# Patient Record
Sex: FEMALE | Race: WHITE | Employment: OTHER | URBAN - METROPOLITAN AREA
[De-identification: names, ages, dates, MRNs, and addresses within clinical notes are randomized per-mention and may not be internally consistent; named-entity substitution may affect disease eponyms.]

---

## 2017-01-12 ENCOUNTER — HOSPITAL ENCOUNTER (OUTPATIENT)
Dept: MAMMOGRAPHY | Age: 57
Discharge: HOME | End: 2017-01-12
Payer: COMMERCIAL

## 2017-01-12 DIAGNOSIS — Z12.31 VISIT FOR SCREENING MAMMOGRAM: ICD-10-CM

## 2017-01-12 PROCEDURE — 77067 SCR MAMMO BI INCL CAD: CPT

## 2018-02-13 ENCOUNTER — HOSPITAL ENCOUNTER (OUTPATIENT)
Dept: MAMMOGRAPHY | Age: 58
Discharge: HOME | End: 2018-02-13
Attending: OBSTETRICS & GYNECOLOGY
Payer: COMMERCIAL

## 2018-02-13 DIAGNOSIS — Z12.39 BREAST CANCER SCREENING: ICD-10-CM

## 2018-02-13 PROCEDURE — 77063 BREAST TOMOSYNTHESIS BI: CPT

## 2019-02-14 ENCOUNTER — HOSPITAL ENCOUNTER (OUTPATIENT)
Dept: MAMMOGRAPHY | Age: 59
Discharge: HOME | End: 2019-02-14
Attending: OBSTETRICS & GYNECOLOGY
Payer: COMMERCIAL

## 2019-02-14 DIAGNOSIS — Z12.31 VISIT FOR SCREENING MAMMOGRAM: ICD-10-CM

## 2019-02-14 PROCEDURE — 77063 BREAST TOMOSYNTHESIS BI: CPT

## 2020-02-18 ENCOUNTER — HOSPITAL ENCOUNTER (OUTPATIENT)
Dept: MAMMOGRAPHY | Age: 60
Discharge: HOME | End: 2020-02-18
Attending: INTERNAL MEDICINE
Payer: COMMERCIAL

## 2020-02-18 DIAGNOSIS — Z12.31 ENCOUNTER FOR SCREENING MAMMOGRAM FOR BREAST CANCER: ICD-10-CM

## 2020-02-18 PROCEDURE — 77063 BREAST TOMOSYNTHESIS BI: CPT

## 2021-01-14 ENCOUNTER — TRANSCRIBE ORDER (OUTPATIENT)
Dept: SCHEDULING | Age: 61
End: 2021-01-14

## 2021-01-14 DIAGNOSIS — Z12.31 VISIT FOR SCREENING MAMMOGRAM: Primary | ICD-10-CM

## 2021-03-19 ENCOUNTER — HOSPITAL ENCOUNTER (OUTPATIENT)
Dept: MAMMOGRAPHY | Age: 61
Discharge: HOME | End: 2021-03-19
Attending: INTERNAL MEDICINE
Payer: COMMERCIAL

## 2021-03-19 DIAGNOSIS — Z12.31 VISIT FOR SCREENING MAMMOGRAM: ICD-10-CM

## 2021-03-19 PROCEDURE — 77063 BREAST TOMOSYNTHESIS BI: CPT

## 2022-02-18 ENCOUNTER — TRANSCRIBE ORDER (OUTPATIENT)
Dept: SCHEDULING | Age: 62
End: 2022-02-18

## 2022-02-18 DIAGNOSIS — Z12.31 ENCOUNTER FOR SCREENING MAMMOGRAM FOR BREAST CANCER: Primary | ICD-10-CM

## 2022-03-03 ENCOUNTER — TELEPHONE (OUTPATIENT)
Dept: INTERNAL MEDICINE CLINIC | Age: 62
End: 2022-03-03

## 2022-03-03 NOTE — TELEPHONE ENCOUNTER
----- Message from Job Nunez sent at 3/3/2022  3:30 PM EST -----  Regarding: New Patient  Patient has not been seen since 2016 so new patient. I do not see where Dr. Valeda Runner said he would take her as a new patient.   Please call and reschedule with another provider

## 2022-03-04 ENCOUNTER — PATIENT MESSAGE (OUTPATIENT)
Dept: NEUROLOGY | Age: 62
End: 2022-03-04

## 2022-03-04 ENCOUNTER — OFFICE VISIT (OUTPATIENT)
Dept: INTERNAL MEDICINE CLINIC | Age: 62
End: 2022-03-04
Payer: COMMERCIAL

## 2022-03-04 VITALS
WEIGHT: 137 LBS | BODY MASS INDEX: 25.21 KG/M2 | DIASTOLIC BLOOD PRESSURE: 82 MMHG | HEIGHT: 62 IN | HEART RATE: 77 BPM | RESPIRATION RATE: 16 BRPM | TEMPERATURE: 97.3 F | OXYGEN SATURATION: 99 % | SYSTOLIC BLOOD PRESSURE: 134 MMHG

## 2022-03-04 DIAGNOSIS — R73.01 IFG (IMPAIRED FASTING GLUCOSE): ICD-10-CM

## 2022-03-04 DIAGNOSIS — R41.3 MEMORY PROBLEM: ICD-10-CM

## 2022-03-04 DIAGNOSIS — E55.9 HYPOVITAMINOSIS D: ICD-10-CM

## 2022-03-04 DIAGNOSIS — E78.5 DYSLIPIDEMIA: ICD-10-CM

## 2022-03-04 DIAGNOSIS — Z00.00 PHYSICAL EXAM: Primary | ICD-10-CM

## 2022-03-04 DIAGNOSIS — D12.6 COLON ADENOMA: ICD-10-CM

## 2022-03-04 DIAGNOSIS — Z00.00 PHYSICAL EXAM: ICD-10-CM

## 2022-03-04 PROCEDURE — 99396 PREV VISIT EST AGE 40-64: CPT | Performed by: INTERNAL MEDICINE

## 2022-03-04 NOTE — PROGRESS NOTES
57 y.o. WHITE/NON- female who presents for NPE. She has been lost to follow up since 2016    They sold their business and she has retired! No cardiovascular complaints. Exercising by walking 4x/week about 7 miles. Denies polyuria, polydipsia, nocturia, vision change. Not checking sugars at this time. She's been able to get her weight down some    Vitals 3/4/2022 4/14/2016 4/8/2015   Weight 137 lb 144 lb 145 lb     No gi or gu complaints. She saw someone after Dr Nai Gaspar passed away in 2019? She is overdue for colo by several years  Denies any sx referable to the thyroid     Only concern is memory problems. She forgets mostly inconsequential things but it's been bothering her a lot. Interested in pursuing workup as she's noted some progression over the last few years.   No problems with paying bills, driving, she forgets why she walks into a room, trivial conversations, etc    Past Medical History:   Diagnosis Date    Colon adenoma 4/13    Dr. Manjula Baptiste Dyslipidemia     calculated risk score 1.6% (12/13); 1.4% (4/15);    FHx: heart disease     GERD (gastroesophageal reflux disease)     HSV infection     Hypovitaminosis D     Dr. Rachel Perez IFG (impaired fasting glucose) 03/2015    Zoster 4/15    T11 left side     Past Surgical History:   Procedure Laterality Date   Kamini Border      Dr. Mike Fernandez 4/13 adenoma    HX HEMORRHOIDECTOMY       Social History     Socioeconomic History    Marital status:      Spouse name: Not on file    Number of children: 2    Years of education: Not on file    Highest education level: Not on file   Occupational History    Occupation: retired franchise owner   Tobacco Use    Smoking status: Never Smoker    Smokeless tobacco: Not on file   Substance and Sexual Activity    Alcohol use: Yes     Comment: ocassionally    Drug use: No    Sexual activity: Not on file   Other Topics Concern    Not on file   Social History Narrative    Not on file     Social Determinants of Health     Financial Resource Strain:     Difficulty of Paying Living Expenses: Not on file   Food Insecurity:     Worried About Running Out of Food in the Last Year: Not on file    Stew of Food in the Last Year: Not on file   Transportation Needs:     Lack of Transportation (Medical): Not on file    Lack of Transportation (Non-Medical):  Not on file   Physical Activity:     Days of Exercise per Week: Not on file    Minutes of Exercise per Session: Not on file   Stress:     Feeling of Stress : Not on file   Social Connections:     Frequency of Communication with Friends and Family: Not on file    Frequency of Social Gatherings with Friends and Family: Not on file    Attends [employer] Services: Not on file    Active Member of [address] [place] [address] [employer] or Organizations: Not on file    Attends Club or Organization Meetings: Not on file    Marital Status: Not on file   Intimate Partner Violence:     Fear of Current or Ex-Partner: Not on file    Emotionally Abused: Not on file    Physically Abused: Not on file    Sexually Abused: Not on file   Housing Stability:     Unable to Pay for Housing in the Last Year: Not on file    Number of [place] in the Last Year: Not on file    Unstable Housing in the Last Year: Not on file     Family History   Problem Relation Age of Onset    Elevated Lipids Mother     Hypertension Mother     Breast Cancer Mother     Elevated Lipids Father     Hypertension Father     Heart Disease Brother     Heart Disease Maternal Grandfather     Ovarian Cancer Maternal Grandmother      Immunization History   Administered Date(s) Administered    COVID-19, Pfizer Purple top, DILUTE for use, 12+ yrs, 30mcg/0.3mL dose 03/20/2021, 04/10/2021, 10/29/2021    Influenza Vaccine 10/25/2013, 09/17/2014, 10/23/2018, 10/29/2020    Influenza Vaccine (Quad) Mdck Pf (>2 Yrs Flucelvax QUAD 23226) 10/15/2019    Influenza Vaccine (Quad) PF (>6 Mo Flulaval, Fluarix, and >3 Yrs Afluria, Fluzone [id number]) 10/13/2016    Tdap 08/24/2016    Zoster Vaccine, Live 12/17/2013     Current Outpatient Medications   Medication Sig    valACYclovir (VALTREX) 1 gram tablet Take 1 Tab by mouth three (3) times daily. No current facility-administered medications for this visit. Not on File    REVIEW OF SYSTEMS: gyn 2019, mammo 3/21, colo 4/13  Ophtho - no vision change or eye pain  Oral - no mouth pain, tongue or tooth problems  Ears - no hearing loss, ear pain, fullness, no swallowing problems  Cardiac - no CP, PND, orthopnea, edema, palpitations or syncope  Chest - no breast masses  Resp - no wheezing, chronic coughing, dyspnea  GI - no heartburn, nausea, vomiting, change in bowel habits, bleeding, hemorrhoids  Urinary - no dysuria, hematuria, flank pain, urgency, frequency    Visit Vitals  /82   Pulse 77   Temp 97.3 °F (36.3 °C)   Resp 16   Ht 5' 2\" (1.575 m)   Wt 137 lb (62.1 kg)   SpO2 99%   BMI 25.06 kg/m²   A&O x3  Affect is appropriate. Mood stable  No apparent distress  Anicteric, no JVD, adenopathy or thyromegaly. No carotid bruits or radiated murmur  Lungs clear to auscultation, no wheezes or rales  Heart showed regular rate and rhythm. No murmur, rubs, gallops  Abdomen soft nontender, no hepatosplenomegaly or masses. Extremities without edema.   Pulses 1-2+ symmetrically    LABS  From 12/13 showed gluc 99,   cr 0.70, gfr>60, alt 10,          chol 180, tg 89,   hdl 44, ldl-c 119, wbc 5.4, hb 13.2, plt 202,        ua neg  From 3/15 showed   gluc 102, cr 0.77, gfr>60, alt<5,           chol 194, tg 84,   hdl 49, ldl-c 128, wbc 4.4, hb 13.6, plt 192, vit d 28.2, ua neg  From 4/16 showed   gluc 111, cr 0.81, gfr>60, alt 5,   hba1c 5.8, chol 190, tg 167, hdl 42, ldl-c 115, wbc 4.0, hb 12.8, plt 193, tsh 6.44,   hep c-  From 4/16 showed                       tsh 4.27,   ft4 1.28, tpo ab neg    We reviewed the patient's labs from the last several visits to point out trends in the numbers        Patient Active Problem List   Diagnosis Code    Dyslipidemia E78.5    Hypovitaminosis D E55.9    Colon adenoma 4/13 Dr Ketty Tolbert D12.6    IFG (impaired fasting glucose) R73.01     Assessment and plan:  1. Dyslipidemia. Dietary and lifestyle measures, labs drawn today  2. Vit d def. Supplementation  3. Colon adenoma. Fiber, colo to be scheduled GLST  4. IFG. Check labs, dietary and lifestyle measures and continue trying to lose weight  5. Memory issues. Long discussion. Check labs, will send to neuro and neuropsych, defer imaging to them        RTC 3/23    Above conditions discussed at length and patient vocalized understanding. All questions answered to patient satifaction        ICD-10-CM ICD-9-CM    1. Physical exam  Z00.00 V70.9 CBC W/O DIFF      METABOLIC PANEL, COMPREHENSIVE      LIPID PANEL      HEMOGLOBIN A1C W/O EAG      TSH AND FREE T4   2. Colon adenoma  D12.6 211.3 REFERRAL TO GASTROENTEROLOGY   3. Memory problem  R41.3 780.93 REFERRAL TO NEUROLOGY      REFERRAL TO NEUROPSYCHOLOGY   4. Dyslipidemia  E78.5 272.4    5. IFG (impaired fasting glucose)  R73.01 790.21    6.  Hypovitaminosis D  E55.9 268.9

## 2022-03-04 NOTE — PROGRESS NOTES
Harjinder Moscoso presents today for   Chief Complaint   Patient presents with    Physical    Diabetes    Cholesterol Problem       1. \"Have you been to the ER, urgent care clinic since your last visit? Hospitalized since your last visit? \" no    2. \"Have you seen or consulted any other health care providers outside of the [address] [place] [address] since your last visit? \" no     3. For patients aged 39-70: Has the patient had a colonoscopy / FIT/ Cologuard? Yes - no Care Gap present      If the patient is female:    4. For patients aged 41-77: Has the patient had a mammogram within the past 2 years? yes  See top three    5. For patients aged 21-65: Has the patient had a pap smear?

## 2022-03-04 NOTE — LETTER
3/7/2022 8:06 AM        [id number]      Dear Ms. Percy James:    Please call our office at [phone] and schedule an annual physical with labs in March of 2023 for your continued care.         Sincerely,      Charlotte Kilpatrick MD

## 2022-03-06 ENCOUNTER — TELEPHONE (OUTPATIENT)
Dept: INTERNAL MEDICINE CLINIC | Age: 62
End: 2022-03-06

## 2022-03-07 ENCOUNTER — OFFICE VISIT (OUTPATIENT)
Dept: NEUROLOGY | Age: 62
End: 2022-03-07
Payer: COMMERCIAL

## 2022-03-07 ENCOUNTER — HOSPITAL ENCOUNTER (OUTPATIENT)
Dept: LAB | Age: 62
Discharge: HOME | End: 2022-03-07

## 2022-03-07 VITALS
DIASTOLIC BLOOD PRESSURE: 80 MMHG | HEART RATE: 63 BPM | RESPIRATION RATE: 16 BRPM | OXYGEN SATURATION: 97 % | SYSTOLIC BLOOD PRESSURE: 102 MMHG | BODY MASS INDEX: 25.28 KG/M2 | WEIGHT: 138.2 LBS

## 2022-03-07 DIAGNOSIS — R41.89 COGNITIVE CHANGE: ICD-10-CM

## 2022-03-07 DIAGNOSIS — R41.3 MEMORY LOSS: ICD-10-CM

## 2022-03-07 DIAGNOSIS — R41.3 MEMORY LOSS: Primary | ICD-10-CM

## 2022-03-07 LAB
ALBUMIN SERPL-MCNC: 4.6 G/DL (ref 3.8–4.8)
ALBUMIN/GLOB SERPL: 2.9 {RATIO} (ref 1.2–2.2)
ALP SERPL-CCNC: 55 IU/L (ref 44–121)
ALT SERPL-CCNC: 12 IU/L (ref 0–32)
AST SERPL-CCNC: 18 IU/L (ref 0–40)
BILIRUB SERPL-MCNC: 0.7 MG/DL (ref 0–1.2)
BUN SERPL-MCNC: 14 MG/DL (ref 8–27)
BUN/CREAT SERPL: 19 (ref 12–28)
CALCIUM SERPL-MCNC: 9.6 MG/DL (ref 8.7–10.3)
CHLORIDE SERPL-SCNC: 106 MMOL/L (ref 96–106)
CHOLEST SERPL-MCNC: 196 MG/DL (ref 100–199)
CO2 SERPL-SCNC: 22 MMOL/L (ref 20–29)
CREAT SERPL-MCNC: 0.73 MG/DL (ref 0.57–1)
EGFR: 93 ML/MIN/1.73
ERYTHROCYTE [DISTWIDTH] IN BLOOD BY AUTOMATED COUNT: 13 % (ref 11.7–15.4)
GLOBULIN SER CALC-MCNC: 1.6 G/DL (ref 1.5–4.5)
GLUCOSE SERPL-MCNC: 94 MG/DL (ref 65–99)
HBA1C MFR BLD: 5.3 % (ref 4.8–5.6)
HCT VFR BLD AUTO: 40.9 % (ref 34–46.6)
HDLC SERPL-MCNC: 53 MG/DL
HGB BLD-MCNC: 13.6 G/DL (ref 11.1–15.9)
IMP & REVIEW OF LAB RESULTS: NORMAL
INTERPRETATION: NORMAL
LDLC SERPL CALC-MCNC: 128 MG/DL (ref 0–99)
MCH RBC QN AUTO: 29.6 PG (ref 26.6–33)
MCHC RBC AUTO-ENTMCNC: 33.3 G/DL (ref 31.5–35.7)
MCV RBC AUTO: 89 FL (ref 79–97)
PLATELET # BLD AUTO: 200 X10E3/UL (ref 150–450)
POTASSIUM SERPL-SCNC: 5.2 MMOL/L (ref 3.5–5.2)
PROT SERPL-MCNC: 6.2 G/DL (ref 6–8.5)
RBC # BLD AUTO: 4.6 X10E6/UL (ref 3.77–5.28)
SODIUM SERPL-SCNC: 143 MMOL/L (ref 134–144)
T4 FREE SERPL-MCNC: 1.19 NG/DL (ref 0.82–1.77)
TRIGL SERPL-MCNC: 82 MG/DL (ref 0–149)
TSH SERPL DL<=0.005 MIU/L-ACNC: 3.68 UIU/ML (ref 0.45–4.5)
VLDLC SERPL CALC-MCNC: 15 MG/DL (ref 5–40)
WBC # BLD AUTO: 3.2 X10E3/UL (ref 3.4–10.8)
XX-LABCORP SPECIMEN COL,LCBCF: NORMAL

## 2022-03-07 PROCEDURE — 99001 SPECIMEN HANDLING PT-LAB: CPT

## 2022-03-07 PROCEDURE — 99204 OFFICE O/P NEW MOD 45 MIN: CPT | Performed by: NURSE PRACTITIONER

## 2022-03-07 NOTE — PROGRESS NOTES
Gracie Govea is a 57 y.o. female who is in the office as a New Patient. 1. Have you been to the ER, urgent care clinic since your last visit? Hospitalized since your last visit? No    2. Have you seen or consulted any other health care providers outside of the [address] [place] [address] since your last visit? Include any pap smears or colon screening.  No

## 2022-03-07 NOTE — PROGRESS NOTES
[facility]  [address], [address], [place], [address]  [address] [place], [address]  Office:  [phone]  Fax: [phone]    Referring: Juancarlos Sutton MD      Chief Complaint   Patient presents with    New Patient    Memory Loss       HPI:  Sherrill Tony presents in new patient evaluation for memory problems. She saw her PCP on 3/4/2022 for new patient evaluation. Her only concern was memory problems. It was noted that she forgets mostly inconsequential things but has been bothering her a lot. She was interested in pursuing work-up as she has noted some progression over the last few years. No problems with paying bills or driving. She forgets why she walks into a room, trivial conversations, etc. 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 She exercises by walking four times a week. Past medical history includes: Adenoma, impaired fasting glucose, zoster at T11 on the left side, dyslipidemia, GERD, HSV infection, and low vitamin D. Family history positive for heart disease. She had labs on 3/4/2022 which showed CBC normal except WBC mildly low, 3.2, CMP showed A/G ratio elevated, 2.9, but otherwise normal. Lipid panel showed elevated LDL-C of 128. Hemoglobin A1c normal, 5.3. TSH normal.    She presents today in evaluation. Reports she has had some minor memory concerns when seen her PCP back in 2016 and reestablished care with him recently. She has a hard time with her memory. It was believed that this may be normal aging at first, but this still continues, is more noticeable, and she does not feel like she is remembering nearly enough. No problems with ADLs. She drives. 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 Often can't remember movies, etc, can't remember actresses for example. She forgets what she went into a room for. Forgets some stuff from childhood.   Her mother is 16years older than her, her memory is great.  When she speaks to family members it may trigger her memory, but did not initially remember some things. She has a hard time recalling little things. She could not remember the sequence of exercises at a fitness class with her daughter. It upset her, made her feel like she is not in control of her mind. Before retiring, she may forget where she saved a file, but felt like that may be just getting older. She likes to always keep to a routine. It is hard working the tv with multiple remotes in multiple different settings. She cooks. No safety concerns. No driving concerns. No getting lost.  Denies pain. She is going to have a colonoscopy, gets some side pain intermittently. When she wakes in the AM, sometimes has a pain in the chest, better when sleeps on back, but some tightness when sleeps on her side. Not reproducible. Right in the center. She sleeps through the night, may get up 1-2 times to void, but gets back to sleep. She snores, but not particularly loud and denies other concerns for sleep apnea. She sleeps lightly. Denies daytime fatigue. She goes to bed early. Denies history of mood disorder. Denies stroke, seizures, head injury, or LOC spell. Denies tremor. Denies mobility problems. She walks a lot for exercise, 5-6 miles a few times a week. Her only surgery was hemorrhoid surgery. Denies headaches. She has a hard time focusing if the radio is on or other people are talking like her kids or others nearby at [facility]. Multitasking is difficult. Family history: Positive for heart disease. Her grandfather  of an MI in his 45s and her brother did as well. Her mother is well, with hyperlipidemia. Her brother was born with a frontal lobe brain disorder, has a variety of health issues, is disabled, with hypertension and hyperlipidemia. Denies known family history of dementia. Social history: She lives with her . She has 4 adult children.   She is retired, just retired in December from [employer]. Denies smoking or illicit drug use. She has never been a smoker. Occasional alcohol use, about 3 days/week, the most in one day would be 3 drinks. Social History     Socioeconomic History    Marital status:      Spouse name: Not on file    Number of children: 2    Years of education: Not on file    Highest education level: Not on file   Occupational History    Occupation: retired franchise owner   Tobacco Use    Smoking status: Never Smoker    Smokeless tobacco: Not on file   Substance and Sexual Activity    Alcohol use: Yes     Comment: ocassionally    Drug use: No    Sexual activity: Not on file   Other Topics Concern    Not on file   Social History Narrative    Not on file     Social Determinants of Health     Financial Resource Strain:     Difficulty of Paying Living Expenses: Not on file   Food Insecurity:     Worried About [address] in the Last Year: Not on file    [address] in the Last Year: Not on file   Transportation Needs:     Lack of Transportation (Medical): Not on file    Lack of Transportation (Non-Medical):  Not on file   Physical Activity:     Days of Exercise per Week: Not on file    Minutes of Exercise per Session: Not on file   Stress:     Feeling of Stress : Not on file   Social Connections:     Frequency of Communication with Friends and Family: Not on file    Frequency of Social Gatherings with Friends and Family: Not on file    Attends Buddhist Services: Not on file    Active Member of Clubs or Organizations: Not on file    Attends Club or Organization Meetings: Not on file    Marital Status: Not on file   Intimate Partner Violence:     Fear of Current or Ex-Partner: Not on file    Emotionally Abused: Not on file    Physically Abused: Not on file    Sexually Abused: Not on file   Housing Stability:     Unable to Pay for Housing in the Last Year: Not on file    Number of Places Lived in the Last Year: Not on file    Unstable Housing in the Last Year: Not on file       Family History   Problem Relation Age of Onset    Elevated Lipids Mother     Hypertension Mother     Breast Cancer Mother     Elevated Lipids Father     Hypertension Father     Heart Disease Brother     Heart Disease Maternal Grandfather     Ovarian Cancer Maternal Grandmother        Current Outpatient Medications   Medication Sig Dispense Refill    valACYclovir (VALTREX) 1 gram tablet Take 1 Tab by mouth three (3) times daily. 27 Tab 0       Past Medical History:   Diagnosis Date    Colon adenoma 4/13    Dr. Roxane Randall    Dyslipidemia     calculated risk score 1.6% (12/13); 1.4% (4/15);    FHx: heart disease     GERD (gastroesophageal reflux disease)     HSV infection     Hypovitaminosis D     Dr. Jonny Spencer IFG (impaired fasting glucose) 03/2015    Zoster 4/15    T11 left side       Past Surgical History:   Procedure Laterality Date   Ivan Randall 4/13 adenoma    HX HEMORRHOIDECTOMY         No Known Allergies    Patient Active Problem List   Diagnosis Code    Dyslipidemia E78.5    Hypovitaminosis D E55.9    Colon adenoma 4/13 Dr Roxane Randall D12.6    IFG (impaired fasting glucose) R73.01         Review of Systems:   Constitutional: no fever or chills  Skin denies rash or itching  HEENT:  Denies tinnitus, hearing loss, or visual changes  Respiratory: denies shortness of breath  Cardiovascular: denies chest pain, dyspnea on exertion. +chest discomfort when she wakes up- pressure like someone standing on center of chest, right in center.    Gastrointestinal: does not report nausea or vomiting. +bowel problems in spells, going to see a gastroenterologist.   Genitourinary: does not report dysuria or incontinence  Musculoskeletal: does not report joint pain or swelling  Endocrine: denies weight change  Hematology: denies easy bruising or bleeding   Neurological: as above in HPI      PHYSICAL EXAMINATION:      VITAL SIGNS:    Visit Vitals  /80   Pulse 63   Resp 16   Wt 62.7 kg (138 lb 3.2 oz)   LMP  (LMP Unknown)   SpO2 97%   BMI 25.28 kg/m²       GENERAL: Well developed, well nourished, in no apparent distress. HEART: RR, no murmurs heard, no carotid bruits. LUNGS:                      Respirations regular and unlabored. EXTREMITIES: No clubbing, cyanosis, or edema is identified. Pulses 2+    and symmetrical.  HEAD:   Normocephalic, atraumatic. NEUROLOGIC EXAMINATION    MENTAL STATUS: Awake, alert, and oriented x 4. Attention and STM are grossly normal. There is no aphasia. Fund of knowledge is adequate. Mood and affect are appropriate. She scored 30 out of 30 on the MMSE. Registration: 3 out of 3 objects. Recall at 1 minute: 3 objects, initially recalled 2 but was able to say the third and appeared upset that she thought she could not remember it. No problems on clock drawing. CRANIAL NERVES: Visual fields are full to confrontation. Pupils are reactive to light and accommodation. Extraocular movements are intact and there is no nystagmus. Facial sensation is normal.  Face is symmetrical.   Hearing is grossly intact. SCM/TPZ 5/5. Palate rises symmetrically. Tongue is in the midline. MOTOR:  Normal tone, bulk, and strength, 5/5 muscle strength throughout. No drift of the bilateral upper extremities. Fine finger movements symmetrical.  No cogwheel rigidity present. CEREBELLAR: Finger to nose was normal.   No tremors or dysmetria. SENSORY: Normal PP, LT, andvibration. DTRs:  +2 throughout. GAIT:   Normal gait. She can tandem walk without difficulty and has heel wedges on.             CBC:   Lab Results   Component Value Date/Time    WBC 3.2 (L) 03/04/2022 10:07 AM    RBC 4.60 03/04/2022 10:07 AM    HGB 13.6 03/04/2022 10:07 AM    HCT 40.9 03/04/2022 10:07 AM    PLATELET 090 93/09/1650 10:07 AM     BMP:   Lab Results   Component Value Date/Time    Glucose 94 03/04/2022 10:07 AM    Sodium 143 03/04/2022 10:07 AM    Potassium 5.2 03/04/2022 10:07 AM    Chloride 106 03/04/2022 10:07 AM    CO2 22 03/04/2022 10:07 AM    BUN 14 03/04/2022 10:07 AM    Creatinine 0.73 03/04/2022 10:07 AM    Calcium 9.6 03/04/2022 10:07 AM     CMP:   Lab Results   Component Value Date/Time    Glucose 94 03/04/2022 10:07 AM    Sodium 143 03/04/2022 10:07 AM    Potassium 5.2 03/04/2022 10:07 AM    Chloride 106 03/04/2022 10:07 AM    CO2 22 03/04/2022 10:07 AM    BUN 14 03/04/2022 10:07 AM    Creatinine 0.73 03/04/2022 10:07 AM    Calcium 9.6 03/04/2022 10:07 AM    BUN/Creatinine ratio 19 03/04/2022 10:07 AM    Alk. phosphatase 55 03/04/2022 10:07 AM    Protein, total 6.2 03/04/2022 10:07 AM    Albumin 4.6 03/04/2022 10:07 AM    A-G Ratio 2.9 (H) 03/04/2022 10:07 AM       Impression/Plan: This is a 69-year-old right-handed female who presents in new patient evaluation for memory concerns. She has concerns over the past few years that she has not been remembering enough, has a hard time remembering things from the past, forgetting little things, or why she has gone into a room. Multitasking and focus has gotten more difficult such as hearing others talk while she is trying to focus on something else. She is not affected in ADLs. She has been referred for neuropsychological evaluation. I agree that this will be a good part of the evaluation. She will be set up to see Dr. Ronny Foley. We will obtain an imaging study with MRI of the brain. Will obtain a vitamin B12 level. She has had history of elevated TSH in the past but her last TSH was normal.  She exercises regularly. She stays active and engaged in activities to keep the mind stimulated, doing things with kids and socializing with friends after prison. Advised on alcohol moderation. We discussed the consideration for a sleep evaluation but she is not very concerned with a potential sleep disorder.   Advised to let her PCP know about the intermittent chest discomfort. We will follow up after neuropsychological evaluation. Diagnoses and all orders for this visit:    1. Memory loss  -     MRI BRAIN WO CONT; Future  -     VITAMIN B12 & FOLATE; Future    2. Cognitive change  -     MRI BRAIN WO CONT; Future  -     VITAMIN B12 & FOLATE; Future        I spent 50 minutes with the patient in face-to-face consultation, with 30 minutes spent in counseling and coordination of care as described above. Signed By: Jeannette Montero NP              PLEASE NOTE:   Portions of this document may have been produced using voice recognition software. Unrecognized errors in transcription may be present.

## 2022-03-07 NOTE — TELEPHONE ENCOUNTER
pls call      Results for orders placed or performed in visit on 03/04/22   TSH AND FREE T4   Result Value Ref Range    TSH 3.680 0.450 - 4.500 uIU/mL    T4, Free 1.19 0.82 - 0.83 ng/dL   METABOLIC PANEL, COMPREHENSIVE   Result Value Ref Range    Glucose 94 65 - 99 mg/dL    BUN 14 8 - 27 mg/dL    Creatinine 0.73 0.57 - 1.00 mg/dL    eGFR 93 >59 mL/min/1.73    BUN/Creatinine ratio 19 12 - 28    Sodium 143 134 - 144 mmol/L    Potassium 5.2 3.5 - 5.2 mmol/L    Chloride 106 96 - 106 mmol/L    CO2 22 20 - 29 mmol/L    Calcium 9.6 8.7 - 10.3 mg/dL    Protein, total 6.2 6.0 - 8.5 g/dL    Albumin 4.6 3.8 - 4.8 g/dL    GLOBULIN, TOTAL 1.6 1.5 - 4.5 g/dL    A-G Ratio 2.9 (H) 1.2 - 2.2    Bilirubin, total 0.7 0.0 - 1.2 mg/dL    Alk.  phosphatase 55 44 - 121 IU/L    AST (SGOT) 18 0 - 40 IU/L    ALT (SGPT) 12 0 - 32 IU/L   CBC W/O DIFF   Result Value Ref Range    WBC 3.2 (L) 3.4 - 10.8 x10E3/uL    RBC 4.60 3.77 - 5.28 x10E6/uL    HGB 13.6 11.1 - 15.9 g/dL    HCT 40.9 34.0 - 46.6 %    MCV 89 79 - 97 fL    MCH 29.6 26.6 - 33.0 pg    MCHC 33.3 31.5 - 35.7 g/dL    RDW 13.0 11.7 - 15.4 %    PLATELET 845 022 - 473 x10E3/uL   LIPID PANEL   Result Value Ref Range    Cholesterol, total 196 100 - 199 mg/dL    Triglyceride 82 0 - 149 mg/dL    HDL Cholesterol 53 >39 mg/dL    VLDL, calculated 15 5 - 40 mg/dL    LDL, calculated 128 (H) 0 - 99 mg/dL   CVD REPORT   Result Value Ref Range    INTERPRETATION Note    CKD REPORT   Result Value Ref Range    Interpretation Note    HEMOGLOBIN A1C W/O EAG   Result Value Ref Range    Hemoglobin A1c 5.3 4.8 - 5.6 %     Thyroid, cbc, cmp, a1c ok  Lipid slightly high - lifestyle and dietary measures, recheck in 1 year

## 2022-03-08 DIAGNOSIS — R41.89 COGNITIVE CHANGE: ICD-10-CM

## 2022-03-08 DIAGNOSIS — R41.3 MEMORY LOSS: ICD-10-CM

## 2022-03-08 LAB
FOLATE SERPL-MCNC: 7.9 NG/ML
VIT B12 SERPL-MCNC: 300 PG/ML (ref 232–1245)

## 2022-03-10 ENCOUNTER — TELEPHONE (OUTPATIENT)
Dept: INTERNAL MEDICINE CLINIC | Age: 62
End: 2022-03-10

## 2022-03-10 DIAGNOSIS — E53.8 LOW VITAMIN B12 LEVEL: ICD-10-CM

## 2022-03-10 DIAGNOSIS — E53.8 LOW VITAMIN B12 LEVEL: Primary | ICD-10-CM

## 2022-03-10 NOTE — TELEPHONE ENCOUNTER
----- Message from Patel Lamont sent at 3/10/2022  4:35 PM EST -----  Subject: Referral Request    QUESTIONS   Reason for referral request? Blood Work before appointment on 03/09/2023. Please contact pt as soon as possible. Has the physician seen you for this condition before? No   Preferred Specialist (if applicable)? Do you already have an appointment scheduled? Yes  Select Scheduled Date? 2023-03-09  Select Scheduled Physician? Ryan Campos   Additional Information for Provider?   ---------------------------------------------------------------------------  --------------  Golden COLE  What is the best way for the office to contact you? OK to leave message on   voicemail  Preferred Call Back Phone Number?  [phone]

## 2022-03-17 ENCOUNTER — HOSPITAL ENCOUNTER (OUTPATIENT)
Dept: LAB | Age: 62
Discharge: HOME | End: 2022-03-17

## 2022-03-17 LAB — XX-LABCORP SPECIMEN COL,LCBCF: NORMAL

## 2022-03-17 PROCEDURE — 99001 SPECIMEN HANDLING PT-LAB: CPT

## 2022-03-22 ENCOUNTER — HOSPITAL ENCOUNTER (OUTPATIENT)
Dept: MAMMOGRAPHY | Age: 62
Discharge: HOME | End: 2022-03-22
Attending: INTERNAL MEDICINE
Payer: COMMERCIAL

## 2022-03-22 DIAGNOSIS — Z12.31 ENCOUNTER FOR SCREENING MAMMOGRAM FOR BREAST CANCER: ICD-10-CM

## 2022-03-22 LAB
FOLATE SERPL-MCNC: 8.1 NG/ML
HCYS SERPL-SCNC: 8.5 UMOL/L (ref 0–17.2)
METHYLMALONATE SERPL-SCNC: 189 NMOL/L (ref 0–378)
VIT B12 SERPL-MCNC: 282 PG/ML (ref 232–1245)

## 2022-03-22 PROCEDURE — 77063 BREAST TOMOSYNTHESIS BI: CPT

## 2022-03-30 ENCOUNTER — TELEPHONE (OUTPATIENT)
Dept: NEUROLOGY | Age: 62
End: 2022-03-30

## 2022-04-14 ENCOUNTER — OFFICE VISIT (OUTPATIENT)
Dept: NEUROLOGY | Age: 62
End: 2022-04-14
Payer: COMMERCIAL

## 2022-04-14 DIAGNOSIS — F41.8 ANXIETY ABOUT HEALTH: ICD-10-CM

## 2022-04-14 DIAGNOSIS — R41.3 MEMORY LOSS: Primary | ICD-10-CM

## 2022-04-14 PROCEDURE — 90791 PSYCH DIAGNOSTIC EVALUATION: CPT | Performed by: PSYCHOLOGIST

## 2022-04-14 NOTE — PROGRESS NOTES
[facility]  Neuroscience   [address] [place] [address]. [facility], [address]  Office:  [phone]  Fax: [phone]                  Initial Office Exam  Patient Name: Lashawn Wright  Age: 57 y.o. Gender: female   Handedness: right handed   Presenting Concern: memory loss  Primary Care Physician: Ellie Cruz MD  Referring Provider: Ellie Cruz MD      REASON FOR REFERRAL:  This comprehensive and medically necessary neuropsychological assessment was requested to assist a differential diagnosis of memory concerns. The use and purpose of this examination, as well as the extent and limitations of confidentiality, were explained prior to obtaining permission to participate. Instructions were provided regarding the necessity to put forth optimal effort and answer questions truthfully in order to obtain reliable and accurate test results. REVIEW OF RECORDS:  Ms. Nadia Gandara was referred by internal medicine for work-up of memory loss. Records indicate that she forgets inconsequential things though it is bothering her. She has requested a work-up due to a subjective observation that her cognitive decline is worsening. ADLs, bill payment, and driving are intact. An MRI of the brain on 3/29/2022 showed no acute intracranial abnormality and mild chronic microvascular ischemic change. Current Outpatient Medications   Medication Sig    valACYclovir (VALTREX) 1 gram tablet Take 1 Tab by mouth three (3) times daily. No current facility-administered medications for this visit.        Past Medical History:   Diagnosis Date    Colon adenoma 4/13    Dr. Fortunato Christensen    Dyslipidemia     calculated risk score 1.6% (12/13); 1.4% (4/15);    FHx: heart disease     GERD (gastroesophageal reflux disease)     HSV infection     Hypovitaminosis D     Dr. Andrzej Siddiqi IFG (impaired fasting glucose) 03/2015    Zoster 4/15    T11 left side         Past Surgical History: Procedure Laterality Date    HX COLONOSCOPY      Dr. Sophia Garland 4/13 adenoma    HX HEMORRHOIDECTOMY         CLINICAL INTERVIEW:  Ms. Otoneil Dominguez was unaccompanied for her initial visit. Consistent with records, she reported memory loss which she believes has been chronic. However, she believes it has been progressing over the last year. Neurologic history is negative for seizures, stroke, syncope, and significant head trauma. Sleep disturbance includes snoring. Pain complaints include intestinal symptoms; Ms. Otoniel Dominguez is followed by the gastroenterologist.  Alcohol use includes 2 alcoholic beverages per day on the weekends. There is no tobacco or illicit substance use. Family history of neurologic illness was denied. With regard to emotional functioning, Ms. Otoniel Dominguez denied a history of psychiatric illness, psychiatric hospitalization, suicidal ideation, self-harm behaviors, psychotic symptoms, and psychological trauma. Ms. Otoniel Dominguez did however describe some childhood stress related to her mother's multiple marriages. Socially, Ms. Otoniel Dominguez has been  for 22 years. She has 1 biological child and 3 stepchildren. She lives with her  and reported having good friend and family support. She exercises regularly. Hobbies are limited. Academically, Ms. 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 She has no history of LD or ADHD. 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 She is enjoying [employer]. Functionally, Ms. Otoniel Dominguez remains independent for ADL and IADL care. She continues to drive without incident.       MENTAL STATUS:    Sensorium  Awake, Aware, Alert   Orientation person, place, time/date, situation, day of week, month of year and year   Relations cooperative   Eye Contact appropriate   Appearance:  age appropriate   Motor Behavior:  within normal limits   Speech:  normal pitch and normal volume Vocabulary average   Thought Process: within normal limits   Thought Content free of delusions and free of hallucinations   Suicidal ideations none   Homicidal ideations none   Mood:  euthymic   Affect:  mood-congruent   Memory recent  adequate   Memory remote:  adequate   Concentration:  adequate   Abstraction:  abstract   Insight:  fair   Reliability fair   Judgment:  fair         DIAGNOSTIC IMPRESSIONS:  1. Cognitive Decline: R/O Mild Neurocognitive Disorder  2. Anxiety about health      PLAN:  1. Complete a comprehensive neuropsychological assessment to provide a differential diagnosis of presenting concerns as well as to assist with disposition and treatment planning as appropriate. 2. Consider compensatory and remedial cognitive training. 3. Consider nonpharmacological interventions for mood disorder. 30635 x 1 Review of records. Face to face interview w/ patient. Determine test protocol: 60 minutes. Total 1 unit      Job Mcghee, PHD  Licensed Clinical Psychologist    This note was created using voice recognition software. Despite editing, there may be syntax errors.

## 2022-06-07 ENCOUNTER — OFFICE VISIT (OUTPATIENT)
Dept: NEUROLOGY | Age: 62
End: 2022-06-07
Payer: COMMERCIAL

## 2022-06-07 DIAGNOSIS — F41.8 ANXIETY ABOUT HEALTH: ICD-10-CM

## 2022-06-07 DIAGNOSIS — R41.3 MEMORY DEFICITS: Primary | ICD-10-CM

## 2022-06-07 PROCEDURE — 96136 PSYCL/NRPSYC TST PHY/QHP 1ST: CPT | Performed by: PSYCHOLOGIST

## 2022-06-07 PROCEDURE — 96137 PSYCL/NRPSYC TST PHY/QHP EA: CPT | Performed by: PSYCHOLOGIST

## 2022-06-07 PROCEDURE — 96133 NRPSYC TST EVAL PHYS/QHP EA: CPT | Performed by: PSYCHOLOGIST

## 2022-06-07 PROCEDURE — 96138 PSYCL/NRPSYC TECH 1ST: CPT | Performed by: PSYCHOLOGIST

## 2022-06-07 PROCEDURE — 96139 PSYCL/NRPSYC TST TECH EA: CPT | Performed by: PSYCHOLOGIST

## 2022-06-07 PROCEDURE — 96132 NRPSYC TST EVAL PHYS/QHP 1ST: CPT | Performed by: PSYCHOLOGIST

## 2022-06-08 NOTE — PROGRESS NOTES
[facility]  Neuroscience   [facility], [address]  Office:  [phone]  Fax: [phone]                Neuropsychological Evaluation Report    Patient Name: Hernán Mosley  Age: 57 y.o. Gender: female   Handedness: right handed   Presenting Concern: memory loss  Primary Care Physician: Pierce Ayala MD  Referring Provider: Pierce Ayala MD    PATIENT HISTORY (OBTAINED DURING INITIAL CLINICAL EVALUATION):    REASON FOR REFERRAL:  This comprehensive and medically necessary neuropsychological assessment was requested to assist a differential diagnosis of memory concerns. The use and purpose of this examination, as well as the extent and limitations of confidentiality, were explained prior to obtaining permission to participate. Instructions were provided regarding the necessity to put forth optimal effort and answer questions truthfully in order to obtain reliable and accurate test results. REVIEW OF RECORDS:  Ms. Otoniel Dominguez was referred by internal medicine for work-up of memory loss. Records indicate that she forgets inconsequential things though it is bothering her. She has requested a work-up due to a subjective observation that her cognitive decline is worsening. ADLs, bill payment, and driving are intact. An MRI of the brain on 3/29/2022 showed no acute intracranial abnormality and mild chronic microvascular ischemic change. Current Outpatient Medications   Medication Sig    valACYclovir (VALTREX) 1 gram tablet Take 1 Tab by mouth three (3) times daily. No current facility-administered medications for this visit.        Past Medical History:   Diagnosis Date    Colon adenoma 4/13    Dr. Sophia Garland    Dyslipidemia     calculated risk score 1.6% (12/13); 1.4% (4/15);    FHx: heart disease     GERD (gastroesophageal reflux disease)     HSV infection     Hypovitaminosis D     Dr. Karson Yancey IFG (impaired fasting glucose) 03/2015    Zoster 4/15    T11 left side         Past Surgical History:   Procedure Laterality Date    HX COLONOSCOPY      Dr. Angel Burr 4/13 adenoma    HX HEMORRHOIDECTOMY         CLINICAL INTERVIEW:  Ms. Millie Snow was unaccompanied for her initial visit. Consistent with records, she reported memory loss which she believes has been chronic. However, she believes it has been progressing over the last year. Neurologic history is negative for seizures, stroke, syncope, and significant head trauma. Sleep disturbance includes snoring. Pain complaints include intestinal symptoms; Ms. Millie Snow is followed by the gastroenterologist.  Alcohol use includes two alcoholic beverages per day on the weekends. There is no tobacco or illicit substance use. Family history of neurologic illness was denied. With regard to emotional functioning, Ms. Millie Snow denied a history of psychiatric illness, psychiatric hospitalization, suicidal ideation, self-harm behaviors, psychotic symptoms, and psychological trauma. Ms. Millie Snow did however describe some childhood stress related to her mother's multiple marriages. Socially, Ms. Millie Snow has been  for 22 years. She has one biological child and three stepchildren. She lives with her  and reported having good friend and family support. She exercises regularly. Hobbies are limited. Academically, Ms. 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 She has no history of LD or ADHD. 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 Functionally, Ms. Millie Snow remains independent for ADL and IADL care. She continues to drive without incident.       MENTAL STATUS:    Sensorium  Awake, Aware, Alert   Orientation person, place, time/date, situation, day of week, month of year and year   Relations cooperative   Eye Contact appropriate   Appearance:  age appropriate   Motor Behavior:  within normal limits   Speech:  normal pitch and normal volume   Vocabulary average   Thought Process: within normal limits   Thought Content free of delusions and free of hallucinations   Suicidal ideations none   Homicidal ideations none   Mood:  euthymic   Affect:  mood-congruent   Memory recent  adequate   Memory remote:  adequate   Concentration:  adequate   Abstraction:  abstract   Insight:  fair   Reliability fair   Judgment:  fair       METHODS OF ASSESSMENT (Current Evaluation):  Clinician Administered:  Cotton Anxiety Scale (GURWINDER)  Cotton Depression Scale-II (BDI-II)  Detailed Assessment of Posttraumatic Stress (DAPS)  Personality Assessment Inventory (RAIMUNDO-2)    Technician Administered (Cristel Davey):  Category Fluency for Animals  Controlled Oral Word Association Test  AMBER-2 Continuous Performance Test  Neuropsychological Assessment Battery-Memory Module and Select Subtests  Reliable Digit Span  Trailmaking Test  Wechsler Adult Intelligence Scale-IV  Wisconsin Card Sorting Test    TEST OBSERVATIONS:  Ms. Jose Ramirez arrived promptly for the testing session. Dress and grooming were appropriate; physical presentation was unchanged from that observed during the clinical interview. Speech was fluent but occasional naming difficulties were noted. Response style was impulsive. No expressive or receptive language deficits were noted. No unusual behaviors or psychomotor abnormalities were observed. Thought process was logical, relevant, and focused. Thought content showed no apparent delusional ideation. Auditory and visual hallucinations were denied and there was no obvious response to internal stimuli. Affect was congruent with the anxious mood conveyed. Ms. Chetan Lima was adequately cooperative and appeared to put forth good effort throughout this examination. However she was overwhelmed and tearful at times, particularly when challenged by more difficult tests.  Rapport with the examiner was adequately established and maintained. Minimal prompting was required. Comprehension of test instructions was not problematic. Performance motivation was objectively measured with one instrument (Reliable Digit Span); Ms. Johan Mccarthy produced a normal score on this measure. Accordingly, test findings below do not appear to be the product of disingenuous effort. Given the above observations, plus comments contained in the Mental Status section, the results of this examination are regarded as reasonably reliable and valid. TEST RESULTS:  Quantitative test results are derived from comparisons to age and education corrected cohort normative data, where applicable. Percentiles are included in these instances. Qualitative test results are determined using clinical observations. General Orientation and Awareness:       Orientation to person yes   Time yes  Place yes  Circumstance yes                     Sensory-Perceptual and Motor Functioning:    Visual and auditory acuity:  Glasses       Gait and balance:   Ambulated independently                                       Classification:      Attention/Concentration:       Simple visuomotor tracking (62 percentile)                 Average     The profile was not consistent with a disorder characterized by attention deficits. To the contrary, Mr. Johan Mccarthy produced scores that ranged from average to superior.     Visuospatial and Constructional Praxis:     Visual discrimination (82 percentile)                             Above Average     Language:         Phonemic verbal fluency (21 percentile)                                Low Average   Categorical verbal fluency (14 percentile)                 Low Average    Memory and Learning:       Word list immediate recall (5 percentile)                             Mildly Impaired  Word list short delayed recall (24 percentile)                 Low Average  Word list long delayed recall (12 percentile)                            Low Average  Forced Choice Recognition (6 percentile)      Mildly Impaired  Shape learning immediate recognition (86 percentile)      Above Average    Shape learning delayed recognition (21 percentile)                Low Average  Forced Choice Recognition (25 percentile)       Average  Story learning immediate recall (1 percentile)       Severely Impaired  Story learning delayed recall (21 percentile)                            Low Average    Cognitive Tests of Executive Functioning:     Ability to think flexibly, Trailmaking B (18 percentile)                Low Average  Conceptual problem solving                                                                Categories Completed (>16 percentile)        Average        Trials to Complete First Category (>16 percentile)                Average        Failure to Maintain Set (>16 percentile)       Average   Learning to Learn (>16 percentile)       Average    Intellectual and Basic Cognitive Functioning (WAIS-IV)  Verbal Comprehension Index: 93  Percentile: 32     Average   Similarities: 9    Percentile: 37      Vocabulary: 11    Percentile: 63           Information: 6    Percentile: 9     Perceptual Reasoning Index: 104  Percentile: 61     Average   Block Design: 9   Percentile: 37      Matrix Reasonin   Percentile: 63           Visual Puzzles: 12   Percentile: 75     Working Memory Index: 97  Percentile: 42     Average   Digit Span: 10    Percentile: 50      Arithmetic: 9    Percentile: 37     Processing Speed: 100   Percentile: 50     Average   Symbol Search: 8   Percentile: 25      Codin    Percentile: 75     Full Scale IQ: 98    Percentile: 45     Average    Emotional Functioning:  Screening of Emotional/Psychiatric Status:  Level of self-reported anxiety    ()     Minimal  Level of self-reported depression   ()     Minimal    On a measure of symptomatic responding to a specific traumatic event, Ms. Day Casas denied experiencing any of the traumas assessed by the measure. On a measure of general emotional functioning, the profile was considered invalid due to positive impression management. IMPRESSIONS/RECOMMENDATIONS:  Test performance revealed difficulties in the following areas: List learning, list recognition, and immediate story recall. That said, list recall, visual memory, and story recall were tested in the broadly average range. This pattern is not entirely consistent with a neurodegenerative disorder, especially when considered with findings from neuroimaging and the absence of a family history of neurodegenerative disorders. With that in mind, patterns of this kind are sometimes associated with functional interference such as depression and anxiety. That said, Ms. Day Casas denied symptoms of depression and anxiety on self-report measures. However, a measure of general emotional functioning was considered invalid due to positive impression bias. Therefore, I cannot be confident that there is not underlying psychological distress associated with Ms. Brina Brown subjective memory complaints. With regard to treatment, I would recommend serial testing in 12 months to compare with baseline and to inform treatment accordingly. In the interim, pharmacotherapy for cognitive efficiency is deferred to the referring provider. Ms. Day Casas endorsed snoring when interviewed. For this reason, a sleep study to rule out the presence of sleep apnea is suggested. MILES would be expected to exacerbate cognitive difficulties. Ms. Day Casas is commended for maintaining a regular exercise regimen. She is encouraged to also consider the general recommendations below to further bolster cognitive and emotional functioning. DIAGNOSTIC IMPRESSIONS:  1. Memory Deficits  2.  Anxiety about health    GENERAL RECOMMENDATIONS:   Studies have shown that a program of lifestyle intervention consisting of diet, exercise, brain training, mental stimulation, and management of cardiovascular risk factors can reduce cognitive decline and prolong the persons independence. Exercise: Exercise can improve your thinking and ability to focus. Activities such as gardening, caring for pets, or walking, can help improve your attention and concentration levels. Meditation: Meditation can help improve brain function by increasing your focus and awareness. Day-to-day coping   Use a detailed daily planner, notebooks, reminder notes, or your smart phone. Amaya Girardid Keeping everything in one place makes it easier to find the reminders you may need. You might want to keep track of appointments and schedules, to do lists, important dates, websites, phone numbers and addresses, meeting notes, and even movies youd like to see or books youd like to read.  Do the most demanding tasks at the time of they day when you feel your energy levels are the highest.   Exercise your brain. Take a class, do word puzzles, or learn a new language.  Get enough rest and sleep.  Keep moving. Regular physical activity is not only good for your body, but also improves your mood, makes you feel more alert, and decreases tiredness (fatigue).  Eat veggies. Studies have shown that eating more vegetables is linked to keeping brain power as people age.  Set up and follow routines. Try to keep the same daily schedule.  Pick a certain place for commonly lost objects (like keys) and put them there each time.  Try not to multi-task. Focus on one thing at a time.  Avoid alcohol and other agents that might change your mental state and sleeping patterns   Ask for help when you need it. Friends and loved ones can help with daily tasks to cut down on distractions and help you save mental energy.  Track your memory problems. Keep a diary of when you notice problems and whats going on at the time. Medicines taken, time of day, and the situation youre in might help you figure out what affects your memory. Keeping track of when the problems are most noticeable can also help you prepare. Britney Cummins know to avoid planning important conversations or appointments during those times. This record will also be useful when you talk with your doctor about these problems. Thank you for allowing me the opportunity to assist you in Ms. Pacheco care. Please do not hesitate to contact me should you have additional questions that I may not have addressed. 53493 x 1  96137 x 1  96138 x 1  96139 x 4  96132 x 1  96133 x 1    [facility] Scooter Hernandez, Ph.D.  Licensed Clinical Psychologist        Time Documentation:     89492 x 1   45082 x 1 Neuropsych testing/data gathering by Neuropsychologist: (1 hour). 09417 x1 (first 30 minutes), 96137 x 1 (additional 30 minutes)     96138 x 1  96139 x 4 Test Administration/Data Gathering By Technician: (2.5 hours). 75422 x 1 (first 30 minutes), 96139 x 4 (each additional 30 minutes)     96132 x 1  96133 x 1 Testing Evaluation Services by Neuropsychologist (1 hour, 50 minutes), 82699 x1 (first hour), 02501 x1 (additional 50 minutes)     The above includes: Record review. Review of history provided by patient. Review of collaborative information. Testing by Clinician. Review of raw data. Scoring. Report writing of individual tests administered by Clinician. Integration of individual tests administered by psychometrist with NSE/testing by clinician, review of records/history/collaborative information, case Conceptualization, treatment planning, clinical decision making, report writing, coordination Of Care.  Psychometry test codes as time spent by psychometrist administering and scoring neurocognitive/psychological tests under supervision of neuropsychologist.  Integral services including scoring of raw data, data interpretation, case conceptualization, report writing etcetera were initiated after the patient finished testing/raw data collected and was completed on the date the report was signed. This note will not be viewable in [address] This note was created using voice recognition software. Despite editing, there may be syntax errors.

## 2022-07-07 ENCOUNTER — OFFICE VISIT (OUTPATIENT)
Dept: NEUROLOGY | Age: 62
End: 2022-07-07
Payer: COMMERCIAL

## 2022-07-07 DIAGNOSIS — F41.8 ANXIETY ABOUT HEALTH: ICD-10-CM

## 2022-07-07 DIAGNOSIS — R41.3 MEMORY DEFICITS: Primary | ICD-10-CM

## 2022-07-07 PROCEDURE — 90834 PSYTX W PT 45 MINUTES: CPT | Performed by: PSYCHOLOGIST

## 2022-07-07 NOTE — PROGRESS NOTES
Interactive Psychotherapy/office feedback        Interactive office feedback session with Ms. Mary Grace Wells. I reviewed the results of the recent Neuropsychological Evaluation  including the observed areas of neurocognitive strengths and weaknesses. Education was provided regarding my diagnostic impressions, and treatment plan/options were discussed. I also answered numerous questions related to the clinical findings, including the various methods to improve cognition and mood. CBT, psychoeducation, and supportive psychotherapy techniques were utilized. [employer] and EMDR handout provided. Prior to seeing the patient I reviewed the records, including the previously completed report, the records in [place], and any updated visits from other providers since I saw the patient last.       Diagnoses:      1. Memory Deficits  2. Anxiety about health     The patient will follow up with the referring provider, and reported being very pleased with the services provided. [facility]. [address]358 psychotherapy 45 Minutes      This note was created using voice recognition software. Despite editing, there may be syntax errors.

## 2023-02-28 ENCOUNTER — TELEPHONE (OUTPATIENT)
Age: 63
End: 2023-02-28

## 2023-02-28 DIAGNOSIS — Z00.00 ANNUAL PHYSICAL EXAM: Primary | ICD-10-CM

## 2023-03-02 LAB
ALBUMIN SERPL-MCNC: 4.7 G/DL (ref 3.8–4.8)
ALBUMIN/GLOB SERPL: 2.9 {RATIO} (ref 1.2–2.2)
ALP SERPL-CCNC: 51 IU/L (ref 44–121)
ALT SERPL-CCNC: 9 IU/L (ref 0–32)
AST SERPL-CCNC: 16 IU/L (ref 0–40)
BILIRUB SERPL-MCNC: 0.6 MG/DL (ref 0–1.2)
BUN SERPL-MCNC: 19 MG/DL (ref 8–27)
BUN/CREAT SERPL: 31 (ref 12–28)
CALCIUM SERPL-MCNC: 9.3 MG/DL (ref 8.7–10.3)
CHLORIDE SERPL-SCNC: 103 MMOL/L (ref 96–106)
CHOLEST SERPL-MCNC: 206 MG/DL (ref 100–199)
CO2 SERPL-SCNC: 19 MMOL/L (ref 20–29)
CREAT SERPL-MCNC: 0.62 MG/DL (ref 0.57–1)
EGFRCR SERPLBLD CKD-EPI 2021: 100 ML/MIN/1.73
GLOBULIN SER CALC-MCNC: 1.6 G/DL (ref 1.5–4.5)
GLUCOSE SERPL-MCNC: 96 MG/DL (ref 70–99)
HDLC SERPL-MCNC: 45 MG/DL
LDLC SERPL CALC-MCNC: 144 MG/DL (ref 0–99)
POTASSIUM SERPL-SCNC: 4.1 MMOL/L (ref 3.5–5.2)
PROT SERPL-MCNC: 6.3 G/DL (ref 6–8.5)
SODIUM SERPL-SCNC: 142 MMOL/L (ref 134–144)
SPECIMEN STATUS REPORT: NORMAL
T4 FREE SERPL-MCNC: 1.19 NG/DL (ref 0.82–1.77)
TRIGL SERPL-MCNC: 95 MG/DL (ref 0–149)
TSH SERPL DL<=0.005 MIU/L-ACNC: 4.4 UIU/ML (ref 0.45–4.5)
VLDLC SERPL CALC-MCNC: 17 MG/DL (ref 5–40)

## 2023-03-05 NOTE — PROGRESS NOTES
60 y.o. female who presents RPE. No cardiovascular complaints and still walking 4x/week about 7 miles. Denies polyuria, polydipsia, nocturia, vision change. Not checking sugars at this time. Weight is stable relatively    No gi or gu complaints.  She saw  gyn last year and had colo as well    Denies any sx referable to the thyroid     She saw Dr Fredrick Thayer for memory issues and no specific dx arrived at; she has not noted any worseing and prefers to observe for now    Past Medical History:   Diagnosis Date    Colon adenoma 04/2013    Dr. Prateek Harris    Dyslipidemia     calculated risk score 1.6% (12/13); 1.4% (4/15); 4.7% (3/23)    FHx: heart disease     GERD (gastroesophageal reflux disease)     HSV infection     Hypovitaminosis D     Dr. Sabine Moore    IFG (impaired fasting glucose) 03/2015    Memory problem 03/2022    Dr Fredrick Thayer    Microscopic colitis 09/2022    Dr Amato Counter    Zoster 04/2015    T11 left side     Past Surgical History:   Procedure Laterality Date    COLONOSCOPY      Dr. Prateek Harris 4/13 adenoma; Dr Svetlana Hunter 9/20/22 microscopi colitis    HEMORRHOID SURGERY       Social History     Socioeconomic History    Marital status:      Spouse name: None    Number of children: 2    Years of education: None    Highest education level: None   Occupational History    Occupation: retired franchise owner   Tobacco Use    Smoking status: Never   Substance and Sexual Activity    Alcohol use: Yes    Drug use: No     Social Determinants of Health     Financial Resource Strain: Low Risk     Difficulty of Paying Living Expenses: Not hard at all   Food Insecurity: No Food Insecurity    Worried About Running Out of Food in the Last Year: Never true    Ran Out of Food in the Last Year: Never true   Transportation Needs: Unknown    Lack of Transportation (Non-Medical): No   Housing Stability: Unknown    Unstable Housing in the Last Year: No     Family History   Problem Relation Age of Onset    Elevated Lipids Mother     Hypertension Mother     Breast Cancer Mother     Elevated Lipids Father     Hypertension Father     Heart Disease Brother     Heart Disease Maternal Grandfather     Ovarian Cancer Maternal Grandmother      Immunization History   Administered Date(s) Administered    COVID-19, PFIZER Bivalent BOOSTER, DO NOT Dilute, (age 12y+), IM, 30 mcg/0.3 mL 10/03/2022    COVID-19, PFIZER PURPLE top, DILUTE for use, (age 15 y+), 30mcg/0.3mL 03/20/2021, 04/10/2021, 10/29/2021, 07/09/2022    Influenza Trivalent 09/17/2014, 10/29/2020    Influenza Virus Vaccine 10/25/2013, 10/23/2018    Influenza, FLUARIX, FLULAVAL, FLUZONE (age 10 mo+) AND AFLURIA, (age 1 y+), PF, 0.5mL 10/13/2016    Influenza, FLUCELVAX, (age 10 mo+), MDCK, MDV, 0.5mL 09/27/2022    Influenza, FLUCELVAX, (age 10 mo+), MDCK, PF, 0.5mL 10/15/2019    Tdap (Boostrix, Adacel) 08/24/2016    Zoster Live (Zostavax) 12/17/2013    Zoster Recombinant (Shingrix) 01/22/2022, 03/22/2022     Current Outpatient Medications   Medication Sig    valACYclovir (VALTREX) 1 g tablet Take 1,000 mg by mouth 3 times daily     No current facility-administered medications for this visit. No Known Allergies    REVIEW OF SYSTEMS: gyn 2022, mammo 3/22, colo 8/22 Dr Corley Iron - no vision change or eye pain  Oral - no mouth pain, tongue or tooth problems  Ears - no hearing loss, ear pain, fullness, no swallowing problems  Cardiac - no CP, PND, orthopnea, edema, palpitations or syncope  Chest - no breast masses  Resp - no wheezing, chronic coughing, dyspnea  GI - no heartburn, nausea, vomiting, change in bowel habits, bleeding, hemorrhoids  Urinary - no dysuria, hematuria, flank pain, urgency, frequency    Vitals:    03/15/23 1503   BP: 123/64   Pulse: 78   Resp: 18   Temp: 98 °F (36.7 °C)   SpO2: 98%     A&O x3  Affect is appropriate. Mood stable  No apparent distress  Anicteric, no JVD, adenopathy or thyromegaly.    No carotid bruits or radiated murmur  Lungs clear to auscultation, no wheezes or rales  Heart showed regular rate and rhythm. No murmur, rubs, gallops  Abdomen soft nontender, no hepatosplenomegaly or masses. Extremities without edema.   Pulses 1-2+ symmetrically    LABS  From 12/13 showed gluc 99,   cr 0.70, gfr>60, alt 10,           chol 180, tg 89,   hdl 44, ldl-c 119, wbc 5.4, hb 13.2, plt 202,           ua neg  From 3/15 showed   gluc 102, cr 0.77, gfr>60, alt<5,            chol 194, tg 84,   hdl 49, ldl-c 128, wbc 4.4, hb 13.6, plt 192, vit d 28.2, ua neg  From 4/16 showed   gluc 111, cr 0.81, gfr>60, alt 5,   hba1c 5.8, chol 190, tg 167, hdl 42, ldl-c 115, wbc 4.0, hb 12.8, plt 193, tsh 6.44,   hep c-  From 4/16 showed                         tsh 4.27,   ft4 1.28, tpo ab neg  From 3/22 showed   gluc 94,   cr 0.73, gfr>60, alt 12, hba1c 5.3, chol 196, tg 82,   hdl 53, ldl-c 128, wbc 3.2, hb 13.6, plt 220, tsh 3.68,   ft4 1.19, b12 300, fol 7.9    Results for orders placed or performed in visit on 02/28/23 (from the past 2160 hour(s))   SPECIMEN STATUS REPORT   Result Value Ref Range    Specimen Status Report COMMENT    TSH + Free T4 Panel   Result Value Ref Range    TSH 4.400 0.450 - 4.500 uIU/mL    T4 Free 1.19 0.82 - 1.77 ng/dL   Comprehensive Metabolic Panel   Result Value Ref Range    Glucose 96 70 - 99 mg/dL    BUN 19 8 - 27 mg/dL    Creatinine 0.62 0.57 - 1.00 mg/dL    Est, Glomerular Filtration Rate 100 >59 mL/min/1.73    BUN/Creatinine Ratio 31 (H) 12 - 28    Sodium 142 134 - 144 mmol/L    Potassium 4.1 3.5 - 5.2 mmol/L    Chloride 103 96 - 106 mmol/L    CO2 19 (L) 20 - 29 mmol/L    Calcium 9.3 8.7 - 10.3 mg/dL    Total Protein 6.3 6.0 - 8.5 g/dL    Albumin 4.7 3.8 - 4.8 g/dL    Globulin, Total 1.6 1.5 - 4.5 g/dL    Albumin/Globulin Ratio 2.9 (H) 1.2 - 2.2    Total Bilirubin 0.6 0.0 - 1.2 mg/dL    Alkaline Phosphatase 51 44 - 121 IU/L    AST 16 0 - 40 IU/L    ALT 9 0 - 32 IU/L   Lipid Panel   Result Value Ref Range    Cholesterol 206 (H) 100 - 199 mg/dL    Triglycerides 95 0 - 149 mg/dL    HDL 45 >39 mg/dL    VLDL Cholesterol Calculated 17 5 - 40 mg/dL    LDL Calculated 144 (H) 0 - 99 mg/dL       We reviewed the patient's labs from the last several visits to point out trends in the numbers    Calculated 10 year risk score was 4.7%      Patient Active Problem List   Diagnosis    Hypovitaminosis D    IFG (impaired fasting glucose)    Dyslipidemia    Colon adenoma         Assessment and plan:  1. Dyslipidemia. Dietary and lifestyle measures. Discussed statin risks and benefits. Discussed further risk stratification w ca score, she wants to think about it and will call back if she decides to proceed  2. Vit d def. Supplementation  3. Colon adenoma. Fiber, colo 2027  4. IFG. Check labs, dietary and lifestyle measures and continue trying to lose weight  5. Memory issues. Observation        RTC 3/24    Above conditions discussed at length and patient vocalized understanding. All questions answered to patient satifaction     Diagnosis Orders   1. Physical exam        2. Colon adenoma        3. IFG (impaired fasting glucose)        4. Dyslipidemia        5.  Hypovitaminosis D

## 2023-03-08 NOTE — PROGRESS NOTES
Duran Brandt presents today for   Chief Complaint   Patient presents with    Annual Exam    Discuss Labs     2-28-23    Hyperlipidemia    Blood Sugar Problem     1. \"Have you been to the ER, urgent care clinic since your last visit? Hospitalized since your last visit? \" no    2. \"Have you seen or consulted any other health care providers outside of the [address] [place] [address] since your last visit? \" no     3. For patients aged 39-70: Has the patient had a colonoscopy / FIT/ Cologuard? Yes - no Care Gap present      If the patient is female:    4. For patients aged 41-77: Has the patient had a mammogram within the past 2 years? Yes - no Care Gap present      5. For patients aged 21-65: Has the patient had a pap smear?  No

## 2023-03-15 ENCOUNTER — OFFICE VISIT (OUTPATIENT)
Age: 63
End: 2023-03-15
Payer: COMMERCIAL

## 2023-03-15 VITALS
DIASTOLIC BLOOD PRESSURE: 64 MMHG | WEIGHT: 149 LBS | TEMPERATURE: 98 F | HEART RATE: 78 BPM | HEIGHT: 62 IN | BODY MASS INDEX: 27.42 KG/M2 | SYSTOLIC BLOOD PRESSURE: 123 MMHG | RESPIRATION RATE: 18 BRPM | OXYGEN SATURATION: 98 %

## 2023-03-15 DIAGNOSIS — R73.01 IFG (IMPAIRED FASTING GLUCOSE): ICD-10-CM

## 2023-03-15 DIAGNOSIS — E55.9 HYPOVITAMINOSIS D: ICD-10-CM

## 2023-03-15 DIAGNOSIS — D12.6 COLON ADENOMA: ICD-10-CM

## 2023-03-15 DIAGNOSIS — E78.5 DYSLIPIDEMIA: ICD-10-CM

## 2023-03-15 DIAGNOSIS — Z00.00 PHYSICAL EXAM: Primary | ICD-10-CM

## 2023-03-15 PROCEDURE — 99396 PREV VISIT EST AGE 40-64: CPT | Performed by: INTERNAL MEDICINE

## 2023-03-15 SDOH — ECONOMIC STABILITY: INCOME INSECURITY: HOW HARD IS IT FOR YOU TO PAY FOR THE VERY BASICS LIKE FOOD, HOUSING, MEDICAL CARE, AND HEATING?: NOT HARD AT ALL

## 2023-03-15 SDOH — ECONOMIC STABILITY: HOUSING INSECURITY
IN THE LAST 12 MONTHS, WAS THERE A TIME WHEN YOU DID NOT HAVE A STEADY PLACE TO SLEEP OR SLEPT IN A SHELTER (INCLUDING NOW)?: NO

## 2023-03-15 SDOH — ECONOMIC STABILITY: FOOD INSECURITY: WITHIN THE PAST 12 MONTHS, YOU WORRIED THAT YOUR FOOD WOULD RUN OUT BEFORE YOU GOT MONEY TO BUY MORE.: NEVER TRUE

## 2023-03-15 SDOH — ECONOMIC STABILITY: FOOD INSECURITY: WITHIN THE PAST 12 MONTHS, THE FOOD YOU BOUGHT JUST DIDN'T LAST AND YOU DIDN'T HAVE MONEY TO GET MORE.: NEVER TRUE

## 2023-03-15 ASSESSMENT — PATIENT HEALTH QUESTIONNAIRE - PHQ9
SUM OF ALL RESPONSES TO PHQ9 QUESTIONS 1 & 2: 0
SUM OF ALL RESPONSES TO PHQ QUESTIONS 1-9: 0
1. LITTLE INTEREST OR PLEASURE IN DOING THINGS: 0
2. FEELING DOWN, DEPRESSED OR HOPELESS: 0

## 2023-04-03 ENCOUNTER — HOSPITAL ENCOUNTER (OUTPATIENT)
Facility: HOSPITAL | Age: 63
Discharge: HOME | End: 2023-04-06
Payer: COMMERCIAL

## 2023-04-03 DIAGNOSIS — Z12.31 ENCOUNTER FOR SCREENING MAMMOGRAM FOR BREAST CANCER: ICD-10-CM

## 2023-04-03 PROCEDURE — 77063 BREAST TOMOSYNTHESIS BI: CPT

## 2023-08-11 ENCOUNTER — TELEPHONE (OUTPATIENT)
Age: 63
End: 2023-08-11

## 2023-08-22 ENCOUNTER — TELEPHONE (OUTPATIENT)
Age: 63
End: 2023-08-22

## 2023-08-22 DIAGNOSIS — E78.5 DYSLIPIDEMIA: Primary | ICD-10-CM

## 2023-09-12 ENCOUNTER — HOSPITAL ENCOUNTER (OUTPATIENT)
Facility: HOSPITAL | Age: 63
Discharge: HOME | End: 2023-09-15
Attending: INTERNAL MEDICINE

## 2023-09-12 DIAGNOSIS — E78.5 DYSLIPIDEMIA: ICD-10-CM

## 2023-09-12 PROCEDURE — 75571 CT HRT W/O DYE W/CA TEST: CPT

## 2024-03-08 ENCOUNTER — TRANSCRIBE ORDERS (OUTPATIENT)
Facility: HOSPITAL | Age: 64
End: 2024-03-08

## 2024-03-08 DIAGNOSIS — Z12.31 VISIT FOR SCREENING MAMMOGRAM: Primary | ICD-10-CM

## 2024-03-13 ENCOUNTER — NURSE ONLY (OUTPATIENT)
Age: 64
End: 2024-03-13

## 2024-03-13 DIAGNOSIS — Z00.00 ANNUAL PHYSICAL EXAM: ICD-10-CM

## 2024-03-14 LAB
A/G RATIO: 3.4 RATIO (ref 1.1–2.6)
ALBUMIN SERPL-MCNC: 4.8 G/DL (ref 3.5–5)
ALP BLD-CCNC: 57 U/L (ref 40–120)
ALT SERPL-CCNC: 11 U/L (ref 5–40)
ANION GAP SERPL CALCULATED.3IONS-SCNC: 9 MMOL/L (ref 3–15)
AST SERPL-CCNC: 15 U/L (ref 10–37)
BILIRUB SERPL-MCNC: 0.7 MG/DL (ref 0.2–1.2)
BUN BLDV-MCNC: 15 MG/DL (ref 6–22)
CALCIUM SERPL-MCNC: 9.8 MG/DL (ref 8.4–10.5)
CHLORIDE BLD-SCNC: 105 MMOL/L (ref 98–110)
CHOLESTEROL/HDL RATIO: 5.4 (ref 0–5)
CHOLESTEROL: 232 MG/DL (ref 110–200)
CO2: 29 MMOL/L (ref 20–32)
CREAT SERPL-MCNC: 0.7 MG/DL (ref 0.8–1.4)
ESTIMATED AVERAGE GLUCOSE: 105 MG/DL (ref 91–123)
GLOBULIN: 1.4 G/DL (ref 2–4)
GLOMERULAR FILTRATION RATE: >60 ML/MIN/1.73 SQ.M.
GLUCOSE: 98 MG/DL (ref 70–99)
HBA1C MFR BLD: 5.3 % (ref 4.8–5.6)
HDLC SERPL-MCNC: 43 MG/DL
LDL CHOLESTEROL CALCULATED: 166 MG/DL (ref 50–99)
LDL/HDL RATIO: 3.9
NON-HDL CHOLESTEROL: 189 MG/DL
POTASSIUM SERPL-SCNC: 4.1 MMOL/L (ref 3.5–5.5)
SODIUM BLD-SCNC: 143 MMOL/L (ref 133–145)
TOTAL PROTEIN: 6.2 G/DL (ref 6.2–8.1)
TRIGL SERPL-MCNC: 113 MG/DL (ref 40–149)
VLDLC SERPL CALC-MCNC: 23 MG/DL (ref 8–30)

## 2024-03-23 NOTE — PROGRESS NOTES
63 y.o. female who presents RPE.      No cardiovascular complaints and still walking 4x/week totaling 7-8mi    Denies polyuria, polydipsia, nocturia, vision change.  Not checking sugars at this time. Weight is stable relatively    No gi or gu complaints. Sees gyn    Denies any sx referable to the thyroid     The memory issues have not progressed per pt and her family    Past Medical History:   Diagnosis Date    Agatston CAC score, <100 09/2023    ca score ZERO    Colon adenoma 04/2013    Dr. Buckley    Dyslipidemia     calculated risk score 1.6% (12/13); 1.4% (4/15); 4.7% (3/23)    Elevated TSH 2016    tpo ab neg    FHx: heart disease     GERD (gastroesophageal reflux disease)     HSV infection     Hypovitaminosis D     Dr. Cope    IFG (impaired fasting glucose) 03/2015    Melanoma (HCC) 2023    [facility]    Memory problem 03/2022    Dr Carson    Microscopic colitis 09/2022    Dr Fan    Zoster 04/2015    T11 left side     Past Surgical History:   Procedure Laterality Date    COLONOSCOPY      Dr. Buckley (4/13) adenoma; Dr Em (9/20/22) microscopic colitis    HEMORRHOID SURGERY       Social History     Socioeconomic History    Marital status:      Spouse name: None    Number of children: 2    Years of education: None    Highest education level: None   Occupational History    Occupation: retired franchise owner   Tobacco Use    Smoking status: Never     Passive exposure: Never    Smokeless tobacco: Never   Substance and Sexual Activity    Alcohol use: Yes    Drug use: No     Social Determinants of Health     Financial Resource Strain: Low Risk  (3/27/2024)    Overall Financial Resource Strain (CARDIA)     Difficulty of Paying Living Expenses: Not hard at all   Food Insecurity: No Food Insecurity (3/27/2024)    Hunger Vital Sign     Worried About Running Out of Food in the Last Year: Never true     Ran Out of Food in the Last Year: Never true   Transportation Needs: Unknown (3/27/2024)    PRAPARE -

## 2024-03-27 ENCOUNTER — OFFICE VISIT (OUTPATIENT)
Age: 64
End: 2024-03-27
Payer: COMMERCIAL

## 2024-03-27 VITALS
DIASTOLIC BLOOD PRESSURE: 78 MMHG | HEART RATE: 77 BPM | BODY MASS INDEX: 26.58 KG/M2 | TEMPERATURE: 98.1 F | HEIGHT: 63 IN | OXYGEN SATURATION: 98 % | RESPIRATION RATE: 16 BRPM | SYSTOLIC BLOOD PRESSURE: 122 MMHG | WEIGHT: 150 LBS

## 2024-03-27 DIAGNOSIS — D12.6 COLON ADENOMA: ICD-10-CM

## 2024-03-27 DIAGNOSIS — E78.5 DYSLIPIDEMIA: ICD-10-CM

## 2024-03-27 DIAGNOSIS — R73.01 IFG (IMPAIRED FASTING GLUCOSE): ICD-10-CM

## 2024-03-27 DIAGNOSIS — Z00.00 PHYSICAL EXAM: Primary | ICD-10-CM

## 2024-03-27 DIAGNOSIS — E55.9 HYPOVITAMINOSIS D: ICD-10-CM

## 2024-03-27 PROCEDURE — 99396 PREV VISIT EST AGE 40-64: CPT | Performed by: INTERNAL MEDICINE

## 2024-03-27 RX ORDER — VALACYCLOVIR HYDROCHLORIDE 1 G/1
1000 TABLET, FILM COATED ORAL 3 TIMES DAILY PRN
Qty: 90 TABLET | Refills: 5 | Status: SHIPPED | OUTPATIENT
Start: 2024-03-27

## 2024-03-27 SDOH — ECONOMIC STABILITY: FOOD INSECURITY: WITHIN THE PAST 12 MONTHS, YOU WORRIED THAT YOUR FOOD WOULD RUN OUT BEFORE YOU GOT MONEY TO BUY MORE.: NEVER TRUE

## 2024-03-27 SDOH — ECONOMIC STABILITY: INCOME INSECURITY: HOW HARD IS IT FOR YOU TO PAY FOR THE VERY BASICS LIKE FOOD, HOUSING, MEDICAL CARE, AND HEATING?: NOT HARD AT ALL

## 2024-03-27 SDOH — ECONOMIC STABILITY: FOOD INSECURITY: WITHIN THE PAST 12 MONTHS, THE FOOD YOU BOUGHT JUST DIDN'T LAST AND YOU DIDN'T HAVE MONEY TO GET MORE.: NEVER TRUE

## 2024-03-27 ASSESSMENT — PATIENT HEALTH QUESTIONNAIRE - PHQ9
2. FEELING DOWN, DEPRESSED OR HOPELESS: NOT AT ALL
SUM OF ALL RESPONSES TO PHQ QUESTIONS 1-9: 0
SUM OF ALL RESPONSES TO PHQ9 QUESTIONS 1 & 2: 0
1. LITTLE INTEREST OR PLEASURE IN DOING THINGS: NOT AT ALL

## 2024-03-27 NOTE — PROGRESS NOTES
Radha Sims presents today for   Chief Complaint   Patient presents with    Annual Exam       \"Have you been to the ER, urgent care clinic since your last visit?  Hospitalized since your last visit?\"    NO    “Have you seen or consulted any other health care providers outside of [facility] since your last visit?”    YES - When: approximately 4 months ago.  Where and Why: Dermatology, melanoma.        “Have you had a pap smear?”    NO; last pap around 2 years ago    Date of last Cervical Cancer screen (HPV or PAP): 11/30/2012

## 2024-04-08 ENCOUNTER — HOSPITAL ENCOUNTER (OUTPATIENT)
Facility: HOSPITAL | Age: 64
Discharge: HOME | End: 2024-04-11
Attending: OBSTETRICS & GYNECOLOGY
Payer: COMMERCIAL

## 2024-04-08 DIAGNOSIS — Z12.31 VISIT FOR SCREENING MAMMOGRAM: ICD-10-CM

## 2024-04-08 PROCEDURE — 77063 BREAST TOMOSYNTHESIS BI: CPT

## 2024-09-27 DIAGNOSIS — Z00.00 PHYSICAL EXAM: ICD-10-CM

## 2024-09-30 ENCOUNTER — TELEPHONE (OUTPATIENT)
Facility: CLINIC | Age: 64
End: 2024-09-30

## 2024-09-30 DIAGNOSIS — U07.1 COVID-19 VIRUS INFECTION: Primary | ICD-10-CM

## 2024-09-30 NOTE — TELEPHONE ENCOUNTER
Patient called in stating she tested positive for covid, and was requesting Paxlovid be sent into her pharmacy for her to get. Please advise.    Pharmacy info:  [facility] [address] [place], [place] [id number] [facility] -  [phone] - F [phone]

## 2025-03-19 ENCOUNTER — TRANSCRIBE ORDERS (OUTPATIENT)
Dept: SCHEDULING | Age: 65
End: 2025-03-19

## 2025-03-19 DIAGNOSIS — Z12.31 OTHER SCREENING MAMMOGRAM: Primary | ICD-10-CM

## 2025-04-22 ENCOUNTER — HOSPITAL ENCOUNTER (OUTPATIENT)
Facility: HOSPITAL | Age: 65
Discharge: HOME | End: 2025-04-25
Attending: INTERNAL MEDICINE
Payer: MEDICARE

## 2025-04-22 VITALS — HEIGHT: 62 IN | WEIGHT: 142 LBS | BODY MASS INDEX: 26.13 KG/M2

## 2025-04-22 DIAGNOSIS — Z12.31 OTHER SCREENING MAMMOGRAM: ICD-10-CM

## 2025-04-22 PROCEDURE — 77063 BREAST TOMOSYNTHESIS BI: CPT

## 2025-05-08 ENCOUNTER — HOSPITAL ENCOUNTER (OUTPATIENT)
Facility: HOSPITAL | Age: 65
Setting detail: SPECIMEN
Discharge: HOME | End: 2025-05-11

## 2025-05-08 LAB — SENTARA SPECIMEN COLLECTION: NORMAL

## 2025-05-08 PROCEDURE — 99001 SPECIMEN HANDLING PT-LAB: CPT

## 2025-05-11 NOTE — PROGRESS NOTES
64 y.o. female who presents evaluation     After I saw her  in March, they changed 'everything'    No cardiovascular complaints and she is now walking 7-8 miles daily    Denies polyuria, polydipsia, nocturia, vision change.  Not checking sugars at this time. They 'went keto' and now doing IF and she's lost 15 lbs and feels great!     3/15/2023 3/27/2024 4/22/2025 5/21/2025   Vitals       Weight - Scale 149 lb  150 lb  142 lb  136 lb      No gi or gu complaints. Sees gyn    Denies any sx referable to the thyroid     The memory issues have not progressed per pt and her family    Past Medical History:   Diagnosis Date    Agatston CAC score, <100 09/2023    ca score ZERO    Colon adenoma 04/2013    Dr. Buckley    Dyslipidemia     calculated risk score 1.6% (12/13); 1.4% (4/15); 4.7% (3/23)    Elevated TSH 2016    tpo ab neg    FHx: heart disease     GERD (gastroesophageal reflux disease)     HSV infection     Hypovitaminosis D     Dr. Cope    IFG (impaired fasting glucose) 03/2015    Inverted nipple     Melanoma (HCC) 2023    [facility]    Memory problem 03/2022    Dr Carson    Microscopic colitis 09/2022    Dr Fan    Zoster 04/2015    T11 left side     Past Surgical History:   Procedure Laterality Date    COLONOSCOPY      Dr. Buckley (4/13) adenoma; Dr Em (9/20/22) microscopic colitis    HEMORRHOID SURGERY       Social History     Socioeconomic History    Marital status:      Spouse name: None    Number of children: 2    Years of education: None    Highest education level: None   Occupational History    Occupation: retired franchise owner   Tobacco Use    Smoking status: Never     Passive exposure: Never    Smokeless tobacco: Never   Substance and Sexual Activity    Alcohol use: Yes     Alcohol/week: 3.0 standard drinks of alcohol     Types: 3 Drinks containing 0.5 oz of alcohol per week    Drug use: No    Sexual activity: Not Currently     Partners: Male     Social Drivers of Health     Financial

## 2025-05-12 LAB
25(OH)D3+25(OH)D2 SERPL-MCNC: 33 NG/ML (ref 30–100)
ALBUMIN SERPL-MCNC: 4.6 G/DL (ref 3.9–4.9)
ALP SERPL-CCNC: 48 IU/L (ref 44–121)
ALT SERPL-CCNC: 13 IU/L (ref 0–32)
AST SERPL-CCNC: 17 IU/L (ref 0–40)
BASOPHILS # BLD AUTO: 0 X10E3/UL (ref 0–0.2)
BASOPHILS NFR BLD AUTO: 1 %
BILIRUB SERPL-MCNC: 0.7 MG/DL (ref 0–1.2)
BUN SERPL-MCNC: 19 MG/DL (ref 8–27)
BUN/CREAT SERPL: 24 (ref 12–28)
CALCIUM SERPL-MCNC: 8.9 MG/DL (ref 8.7–10.3)
CHLORIDE SERPL-SCNC: 103 MMOL/L (ref 96–106)
CHOLEST SERPL-MCNC: 169 MG/DL (ref 100–199)
CO2 SERPL-SCNC: 19 MMOL/L (ref 20–29)
CREAT SERPL-MCNC: 0.79 MG/DL (ref 0.57–1)
EGFRCR SERPLBLD CKD-EPI 2021: 83 ML/MIN/1.73
EOSINOPHIL # BLD AUTO: 0.1 X10E3/UL (ref 0–0.4)
EOSINOPHIL NFR BLD AUTO: 3 %
ERYTHROCYTE [DISTWIDTH] IN BLOOD BY AUTOMATED COUNT: 13.5 % (ref 11.7–15.4)
GLOBULIN SER CALC-MCNC: 1.6 G/DL (ref 1.5–4.5)
GLUCOSE SERPL-MCNC: 84 MG/DL (ref 70–99)
HBA1C MFR BLD: 5.1 % (ref 4.8–5.6)
HCT VFR BLD AUTO: 43.1 % (ref 34–46.6)
HDLC SERPL-MCNC: 47 MG/DL
HGB BLD-MCNC: 13.3 G/DL (ref 11.1–15.9)
IMM GRANULOCYTES # BLD AUTO: 0 X10E3/UL (ref 0–0.1)
IMM GRANULOCYTES NFR BLD AUTO: 0 %
LDLC SERPL CALC-MCNC: 109 MG/DL (ref 0–99)
LYMPHOCYTES # BLD AUTO: 0.9 X10E3/UL (ref 0.7–3.1)
LYMPHOCYTES NFR BLD AUTO: 30 %
MCH RBC QN AUTO: 28.8 PG (ref 26.6–33)
MCHC RBC AUTO-ENTMCNC: 30.9 G/DL (ref 31.5–35.7)
MCV RBC AUTO: 93 FL (ref 79–97)
MONOCYTES # BLD AUTO: 0.3 X10E3/UL (ref 0.1–0.9)
MONOCYTES NFR BLD AUTO: 9 %
NEUTROPHILS # BLD AUTO: 1.6 X10E3/UL (ref 1.4–7)
NEUTROPHILS NFR BLD AUTO: 57 %
PLATELET # BLD AUTO: 173 X10E3/UL (ref 150–450)
POTASSIUM SERPL-SCNC: 4.1 MMOL/L (ref 3.5–5.2)
PROT SERPL-MCNC: 6.2 G/DL (ref 6–8.5)
RBC # BLD AUTO: 4.62 X10E6/UL (ref 3.77–5.28)
SODIUM SERPL-SCNC: 142 MMOL/L (ref 134–144)
SPECIMEN STATUS REPORT: NORMAL
TRIGL SERPL-MCNC: 67 MG/DL (ref 0–149)
VLDLC SERPL CALC-MCNC: 13 MG/DL (ref 5–40)
WBC # BLD AUTO: 2.9 X10E3/UL (ref 3.4–10.8)

## 2025-05-16 SDOH — HEALTH STABILITY: PHYSICAL HEALTH: ON AVERAGE, HOW MANY MINUTES DO YOU ENGAGE IN EXERCISE AT THIS LEVEL?: 60 MIN

## 2025-05-16 SDOH — HEALTH STABILITY: PHYSICAL HEALTH: ON AVERAGE, HOW MANY DAYS PER WEEK DO YOU ENGAGE IN MODERATE TO STRENUOUS EXERCISE (LIKE A BRISK WALK)?: 6 DAYS

## 2025-05-16 ASSESSMENT — PATIENT HEALTH QUESTIONNAIRE - PHQ9
SUM OF ALL RESPONSES TO PHQ QUESTIONS 1-9: 0
2. FEELING DOWN, DEPRESSED OR HOPELESS: NOT AT ALL
SUM OF ALL RESPONSES TO PHQ QUESTIONS 1-9: 0
1. LITTLE INTEREST OR PLEASURE IN DOING THINGS: NOT AT ALL

## 2025-05-16 ASSESSMENT — LIFESTYLE VARIABLES
HOW MANY STANDARD DRINKS CONTAINING ALCOHOL DO YOU HAVE ON A TYPICAL DAY: 1
HOW OFTEN DO YOU HAVE A DRINK CONTAINING ALCOHOL: 3
HOW OFTEN DO YOU HAVE SIX OR MORE DRINKS ON ONE OCCASION: 1
HOW MANY STANDARD DRINKS CONTAINING ALCOHOL DO YOU HAVE ON A TYPICAL DAY: 1 OR 2
HOW OFTEN DO YOU HAVE A DRINK CONTAINING ALCOHOL: 2-4 TIMES A MONTH

## 2025-05-18 SDOH — ECONOMIC STABILITY: INCOME INSECURITY: IN THE LAST 12 MONTHS, WAS THERE A TIME WHEN YOU WERE NOT ABLE TO PAY THE MORTGAGE OR RENT ON TIME?: NO

## 2025-05-18 SDOH — ECONOMIC STABILITY: TRANSPORTATION INSECURITY
IN THE PAST 12 MONTHS, HAS THE LACK OF TRANSPORTATION KEPT YOU FROM MEDICAL APPOINTMENTS OR FROM GETTING MEDICATIONS?: NO

## 2025-05-18 SDOH — ECONOMIC STABILITY: FOOD INSECURITY: WITHIN THE PAST 12 MONTHS, THE FOOD YOU BOUGHT JUST DIDN'T LAST AND YOU DIDN'T HAVE MONEY TO GET MORE.: NEVER TRUE

## 2025-05-18 SDOH — ECONOMIC STABILITY: FOOD INSECURITY: WITHIN THE PAST 12 MONTHS, YOU WORRIED THAT YOUR FOOD WOULD RUN OUT BEFORE YOU GOT MONEY TO BUY MORE.: NEVER TRUE

## 2025-05-18 SDOH — ECONOMIC STABILITY: TRANSPORTATION INSECURITY
IN THE PAST 12 MONTHS, HAS LACK OF TRANSPORTATION KEPT YOU FROM MEETINGS, WORK, OR FROM GETTING THINGS NEEDED FOR DAILY LIVING?: NO

## 2025-05-21 ENCOUNTER — OFFICE VISIT (OUTPATIENT)
Facility: CLINIC | Age: 65
End: 2025-05-21
Payer: MEDICARE

## 2025-05-21 VITALS
HEIGHT: 62 IN | WEIGHT: 136 LBS | BODY MASS INDEX: 25.03 KG/M2 | TEMPERATURE: 98.2 F | OXYGEN SATURATION: 99 % | SYSTOLIC BLOOD PRESSURE: 127 MMHG | DIASTOLIC BLOOD PRESSURE: 76 MMHG | HEART RATE: 70 BPM | RESPIRATION RATE: 16 BRPM

## 2025-05-21 DIAGNOSIS — E55.9 HYPOVITAMINOSIS D: ICD-10-CM

## 2025-05-21 DIAGNOSIS — D12.6 COLON ADENOMA: ICD-10-CM

## 2025-05-21 DIAGNOSIS — N95.9 MENOPAUSAL AND PERIMENOPAUSAL DISORDER: ICD-10-CM

## 2025-05-21 DIAGNOSIS — Z00.00 WELCOME TO MEDICARE PREVENTIVE VISIT: Primary | ICD-10-CM

## 2025-05-21 DIAGNOSIS — E78.5 DYSLIPIDEMIA: ICD-10-CM

## 2025-05-21 DIAGNOSIS — D72.819 LEUKOPENIA, UNSPECIFIED TYPE: ICD-10-CM

## 2025-05-21 DIAGNOSIS — R73.01 IFG (IMPAIRED FASTING GLUCOSE): ICD-10-CM

## 2025-05-21 DIAGNOSIS — Z71.89 ADVANCED CARE PLANNING/COUNSELING DISCUSSION: ICD-10-CM

## 2025-05-21 PROCEDURE — G8427 DOCREV CUR MEDS BY ELIG CLIN: HCPCS | Performed by: INTERNAL MEDICINE

## 2025-05-21 PROCEDURE — 3017F COLORECTAL CA SCREEN DOC REV: CPT | Performed by: INTERNAL MEDICINE

## 2025-05-21 PROCEDURE — 1090F PRES/ABSN URINE INCON ASSESS: CPT | Performed by: INTERNAL MEDICINE

## 2025-05-21 PROCEDURE — 1123F ACP DISCUSS/DSCN MKR DOCD: CPT | Performed by: INTERNAL MEDICINE

## 2025-05-21 PROCEDURE — 99214 OFFICE O/P EST MOD 30 MIN: CPT | Performed by: INTERNAL MEDICINE

## 2025-05-21 PROCEDURE — G8420 CALC BMI NORM PARAMETERS: HCPCS | Performed by: INTERNAL MEDICINE

## 2025-05-21 PROCEDURE — G8400 PT W/DXA NO RESULTS DOC: HCPCS | Performed by: INTERNAL MEDICINE

## 2025-05-21 PROCEDURE — 1036F TOBACCO NON-USER: CPT | Performed by: INTERNAL MEDICINE

## 2025-05-21 PROCEDURE — 99497 ADVNCD CARE PLAN 30 MIN: CPT | Performed by: INTERNAL MEDICINE

## 2025-05-21 PROCEDURE — G0402 INITIAL PREVENTIVE EXAM: HCPCS | Performed by: INTERNAL MEDICINE

## 2025-05-21 RX ORDER — ESTRADIOL 0.1 MG/G
0.1 CREAM VAGINAL
COMMUNITY
Start: 2025-04-23

## 2025-05-21 NOTE — ACP (ADVANCE CARE PLANNING)
Advance Care Planning     Advance Care Planning (ACP) Physician/NP/PA Conversation    Date of Conversation: 5/21/2025  Conducted with: Patient with Decision Making Capacity    Healthcare Decision Maker:        Click here to complete Healthcare Decision Makers including selection of the Healthcare Decision Maker Relationship (ie \"Primary\")  Today we documented Decision Maker(s) consistent with Legal Next of Kin hierarchy.    Care Preferences:    Hospitalization:  \"If your health worsens and it becomes clear that your chance of recovery is unlikely, what would be your preference regarding hospitalization?\"  The patient would prefer hospitalization.    Ventilation:  \"If you were unable to breath on your own and your chance of recovery was unlikely, what would be your preference about the use of a ventilator (breathing machine) if it was available to you?\"  The patient would desire the use of a ventilator.    Resuscitation:  \"In the event your heart stopped as a result of an underlying serious health condition, would you want attempts made to restart your heart, or would you prefer a natural death?\"  Yes, attempt to resuscitate.    ventilation preferences, hospitalization preferences, and resuscitation preferences    Conversation Outcomes / Follow-Up Plan:  ACP in process - information provided, considering goals and options  Reviewed DNR/DNI and patient elects Full Code (Attempt Resuscitation)    Length of Voluntary ACP Conversation in minutes:  16 minutes    HENRIETTA INMAN MD

## 2025-05-21 NOTE — PROGRESS NOTES
Medicare Annual Wellness Visit    Radha Sims is here for Medicare AWV    Assessment & Plan   Advanced care planning/counseling discussion  Colon adenoma  IFG (impaired fasting glucose)  -     CBC with Auto Differential; Future  -     Comprehensive Metabolic Panel; Future  -     HEMOGLOBIN A1C W/O EAG; Future  Dyslipidemia  -     Lipid Panel; Future  -     TSH + Free T4 Panel; Future  Hypovitaminosis D  Menopausal and perimenopausal disorder  -     DEXA BONE DENSITY AXIAL SKELETON; Future  Leukopenia, unspecified type  -     CBC with Auto Differential; Future  Welcome to Medicare preventive visit       Return in 1 year (on 5/21/2026) for Medicare AWV.     Subjective       Patient's complete Health Risk Assessment and screening values have been reviewed and are found in Flowsheets. The following problems were reviewed today and where indicated follow up appointments were made and/or referrals ordered.    Positive Risk Factor Screenings with Interventions:                   Vision Screen:  Visual Acuity screen is abnormal due to a score of 20/25 or worse.  Interventions:   Patient declines any further evaluation or treatment            Pt is sexually active        Objective   Vision Screening    Right eye Left eye Both eyes   Without correction 20/50 20/50 20/40   With correction         Vitals:    05/21/25 1400   BP: 127/76   Pulse: 70   Resp: 16   Temp: 98.2 °F (36.8 °C)   TempSrc: Temporal   SpO2: 99%   Weight: 61.7 kg (136 lb)   Height: 1.575 m (5' 2\")      Body mass index is 24.87 kg/m².                  No Known Allergies  Prior to Visit Medications    Medication Sig Taking? Authorizing Provider   estradiol (ESTRACE) 0.1 MG/GM vaginal cream Place 0.1 g vaginally Twice a Week At bedtime Yes Provider, MD Irish   valACYclovir (VALTREX) 1 g tablet Take 1 tablet by mouth 3 times daily as needed (rash) Yes Delgado Hodges MD       [facility] (Including outside providers/suppliers regularly

## 2025-05-21 NOTE — PROGRESS NOTES
Radha Sims presents today for   Chief Complaint   Patient presents with    Medicare AWV       \"Have you been to the ER, urgent care clinic since your last visit?  Hospitalized since your last visit?\"    NO    “Have you seen or consulted any other health care providers outside of [facility] since your last visit?”    YES - When: approximately 1 months ago.  Where and Why: [facility], gynecology.        “Have you had a pap smear?”    NO; told no longer need it after 65    Date of last Cervical Cancer screen (HPV or PAP): 11/30/2012

## 2025-06-04 ENCOUNTER — HOSPITAL ENCOUNTER (OUTPATIENT)
Facility: HOSPITAL | Age: 65
Setting detail: SPECIMEN
Discharge: HOME | End: 2025-06-07
Payer: MEDICARE

## 2025-06-04 DIAGNOSIS — D72.819 LEUKOPENIA, UNSPECIFIED TYPE: ICD-10-CM

## 2025-06-04 LAB
BASOPHILS # BLD: 0.04 K/UL (ref 0–0.1)
BASOPHILS NFR BLD: 1 % (ref 0–2)
DIFFERENTIAL METHOD BLD: ABNORMAL
EOSINOPHIL # BLD: 0.1 K/UL (ref 0–0.4)
EOSINOPHIL NFR BLD: 2.4 % (ref 0–5)
ERYTHROCYTE [DISTWIDTH] IN BLOOD BY AUTOMATED COUNT: 13.6 % (ref 11.6–14.5)
HCT VFR BLD AUTO: 40.3 % (ref 35–45)
HGB BLD-MCNC: 12.8 G/DL (ref 12–16)
IMM GRANULOCYTES # BLD AUTO: 0.02 K/UL (ref 0–0.04)
IMM GRANULOCYTES NFR BLD AUTO: 0.5 % (ref 0–0.5)
LYMPHOCYTES # BLD: 1.15 K/UL (ref 0.9–3.6)
LYMPHOCYTES NFR BLD: 28 % (ref 21–52)
MCH RBC QN AUTO: 29.4 PG (ref 24–34)
MCHC RBC AUTO-ENTMCNC: 31.8 G/DL (ref 31–37)
MCV RBC AUTO: 92.4 FL (ref 78–100)
MONOCYTES # BLD: 0.41 K/UL (ref 0.05–1.2)
MONOCYTES NFR BLD: 10 % (ref 3–10)
NEUTS SEG # BLD: 2.39 K/UL (ref 1.8–8)
NEUTS SEG NFR BLD: 58.1 % (ref 40–73)
NRBC # BLD: 0 K/UL (ref 0–0.01)
NRBC BLD-RTO: 0 PER 100 WBC
PLATELET # BLD AUTO: 187 K/UL (ref 135–420)
PMV BLD AUTO: 12.5 FL (ref 9.2–11.8)
RBC # BLD AUTO: 4.36 M/UL (ref 4.2–5.3)
WBC # BLD AUTO: 4.1 K/UL (ref 4.6–13.2)

## 2025-06-04 PROCEDURE — 85025 COMPLETE CBC W/AUTO DIFF WBC: CPT

## 2025-06-04 PROCEDURE — 36415 COLL VENOUS BLD VENIPUNCTURE: CPT

## 2025-06-09 ENCOUNTER — RESULTS FOLLOW-UP (OUTPATIENT)
Facility: CLINIC | Age: 65
End: 2025-06-09

## 2025-06-09 DIAGNOSIS — D72.819 LEUKOPENIA, UNSPECIFIED TYPE: Primary | ICD-10-CM

## 2025-06-10 NOTE — TELEPHONE ENCOUNTER
Patient is aware of results and wanted to know if she could have her lab tested again in 3-6 months instead of waiting a year.

## 2025-06-13 NOTE — TELEPHONE ENCOUNTER
Cbc ordered for 6 mos  Print and mail to pt pls     Diagnosis Orders   1. Leukopenia, unspecified type  CBC with Auto Differential

## 2025-06-30 ENCOUNTER — HOSPITAL ENCOUNTER (OUTPATIENT)
Facility: HOSPITAL | Age: 65
Discharge: HOME | End: 2025-07-03
Attending: INTERNAL MEDICINE
Payer: MEDICARE

## 2025-06-30 DIAGNOSIS — N95.9 MENOPAUSAL AND PERIMENOPAUSAL DISORDER: ICD-10-CM

## 2025-06-30 PROCEDURE — 77080 DXA BONE DENSITY AXIAL: CPT

## 2025-07-03 NOTE — TELEPHONE ENCOUNTER
Pls call    DEXA shows early osteopenia  but not qualifying for tx  Calcium 1000, vit d [id number], weight bearing exercise and recheck in 2-3 years

## 2025-08-04 RX ORDER — VALACYCLOVIR HYDROCHLORIDE 1 G/1
1000 TABLET, FILM COATED ORAL 3 TIMES DAILY PRN
Qty: 90 TABLET | Refills: 0 | Status: SHIPPED | OUTPATIENT
Start: 2025-08-04